# Patient Record
Sex: MALE | Race: BLACK OR AFRICAN AMERICAN | NOT HISPANIC OR LATINO | Employment: OTHER | URBAN - METROPOLITAN AREA
[De-identification: names, ages, dates, MRNs, and addresses within clinical notes are randomized per-mention and may not be internally consistent; named-entity substitution may affect disease eponyms.]

---

## 2017-06-29 ENCOUNTER — HOSPITAL ENCOUNTER (OUTPATIENT)
Dept: LAB | Facility: MEDICAL CENTER | Age: 67
End: 2017-06-29
Attending: NURSE PRACTITIONER
Payer: MEDICARE

## 2017-06-29 LAB
ALBUMIN SERPL BCP-MCNC: 3.1 G/DL (ref 3.2–4.9)
ALBUMIN/GLOB SERPL: 1.2 G/DL
ALP SERPL-CCNC: 64 U/L (ref 30–99)
ALT SERPL-CCNC: 8 U/L (ref 2–50)
ANION GAP SERPL CALC-SCNC: 9 MMOL/L (ref 0–11.9)
AST SERPL-CCNC: 11 U/L (ref 12–45)
BASOPHILS # BLD AUTO: 0.8 % (ref 0–1.8)
BASOPHILS # BLD: 0.04 K/UL (ref 0–0.12)
BILIRUB SERPL-MCNC: 0.3 MG/DL (ref 0.1–1.5)
BUN SERPL-MCNC: 40 MG/DL (ref 8–22)
CALCIUM SERPL-MCNC: 8.7 MG/DL (ref 8.5–10.5)
CHLORIDE SERPL-SCNC: 111 MMOL/L (ref 96–112)
CHOLEST SERPL-MCNC: 118 MG/DL (ref 100–199)
CO2 SERPL-SCNC: 14 MMOL/L (ref 20–33)
CREAT SERPL-MCNC: 1.93 MG/DL (ref 0.5–1.4)
EOSINOPHIL # BLD AUTO: 0.09 K/UL (ref 0–0.51)
EOSINOPHIL NFR BLD: 1.7 % (ref 0–6.9)
ERYTHROCYTE [DISTWIDTH] IN BLOOD BY AUTOMATED COUNT: 58.8 FL (ref 35.9–50)
EST. AVERAGE GLUCOSE BLD GHB EST-MCNC: 88 MG/DL
GFR SERPL CREATININE-BSD FRML MDRD: 35 ML/MIN/1.73 M 2
GLOBULIN SER CALC-MCNC: 2.6 G/DL (ref 1.9–3.5)
GLUCOSE SERPL-MCNC: 107 MG/DL (ref 65–99)
HBA1C MFR BLD: 4.7 % (ref 0–5.6)
HCT VFR BLD AUTO: 31 % (ref 42–52)
HCV AB SER QL: NEGATIVE
HDLC SERPL-MCNC: 67 MG/DL
HGB BLD-MCNC: 10.4 G/DL (ref 14–18)
IMM GRANULOCYTES # BLD AUTO: 0.01 K/UL (ref 0–0.11)
IMM GRANULOCYTES NFR BLD AUTO: 0.2 % (ref 0–0.9)
LDLC SERPL CALC-MCNC: 36 MG/DL
LYMPHOCYTES # BLD AUTO: 0.86 K/UL (ref 1–4.8)
LYMPHOCYTES NFR BLD: 16.5 % (ref 22–41)
MCH RBC QN AUTO: 34 PG (ref 27–33)
MCHC RBC AUTO-ENTMCNC: 33.5 G/DL (ref 33.7–35.3)
MCV RBC AUTO: 101.3 FL (ref 81.4–97.8)
MONOCYTES # BLD AUTO: 0.53 K/UL (ref 0–0.85)
MONOCYTES NFR BLD AUTO: 10.2 % (ref 0–13.4)
NEUTROPHILS # BLD AUTO: 3.69 K/UL (ref 1.82–7.42)
NEUTROPHILS NFR BLD: 70.6 % (ref 44–72)
NRBC # BLD AUTO: 0 K/UL
NRBC BLD AUTO-RTO: 0 /100 WBC
PLATELET # BLD AUTO: 156 K/UL (ref 164–446)
PMV BLD AUTO: 12.3 FL (ref 9–12.9)
POTASSIUM SERPL-SCNC: 6 MMOL/L (ref 3.6–5.5)
PROT SERPL-MCNC: 5.7 G/DL (ref 6–8.2)
PSA SERPL-MCNC: 0.5 NG/ML (ref 0–4)
RBC # BLD AUTO: 3.06 M/UL (ref 4.7–6.1)
SODIUM SERPL-SCNC: 134 MMOL/L (ref 135–145)
T4 FREE SERPL-MCNC: 0.62 NG/DL (ref 0.53–1.43)
TRIGL SERPL-MCNC: 76 MG/DL (ref 0–149)
TSH SERPL DL<=0.005 MIU/L-ACNC: 2.27 UIU/ML (ref 0.3–3.7)
URATE SERPL-MCNC: 2 MG/DL (ref 2.5–8.3)
WBC # BLD AUTO: 5.2 K/UL (ref 4.8–10.8)

## 2017-06-29 PROCEDURE — 85025 COMPLETE CBC W/AUTO DIFF WBC: CPT

## 2017-06-29 PROCEDURE — 36415 COLL VENOUS BLD VENIPUNCTURE: CPT

## 2017-06-29 PROCEDURE — 84550 ASSAY OF BLOOD/URIC ACID: CPT

## 2017-06-29 PROCEDURE — 80053 COMPREHEN METABOLIC PANEL: CPT

## 2017-06-29 PROCEDURE — 80061 LIPID PANEL: CPT

## 2017-06-29 PROCEDURE — 84153 ASSAY OF PSA TOTAL: CPT

## 2017-06-29 PROCEDURE — 84439 ASSAY OF FREE THYROXINE: CPT

## 2017-06-29 PROCEDURE — 84443 ASSAY THYROID STIM HORMONE: CPT

## 2017-06-29 PROCEDURE — 86803 HEPATITIS C AB TEST: CPT

## 2017-06-29 PROCEDURE — 83036 HEMOGLOBIN GLYCOSYLATED A1C: CPT

## 2017-08-16 ENCOUNTER — HOSPITAL ENCOUNTER (OUTPATIENT)
Dept: LAB | Facility: MEDICAL CENTER | Age: 67
End: 2017-08-16
Attending: NURSE PRACTITIONER
Payer: MEDICARE

## 2017-08-16 LAB
ALBUMIN SERPL BCP-MCNC: 3.2 G/DL (ref 3.2–4.9)
ALBUMIN/GLOB SERPL: 1.2 G/DL
ALP SERPL-CCNC: 77 U/L (ref 30–99)
ALT SERPL-CCNC: 9 U/L (ref 2–50)
ANION GAP SERPL CALC-SCNC: 12 MMOL/L (ref 0–11.9)
AST SERPL-CCNC: 16 U/L (ref 12–45)
BASOPHILS # BLD AUTO: 0.7 % (ref 0–1.8)
BASOPHILS # BLD: 0.04 K/UL (ref 0–0.12)
BILIRUB SERPL-MCNC: 0.4 MG/DL (ref 0.1–1.5)
BUN SERPL-MCNC: 10 MG/DL (ref 8–22)
CALCIUM SERPL-MCNC: 8.6 MG/DL (ref 8.5–10.5)
CHLORIDE SERPL-SCNC: 107 MMOL/L (ref 96–112)
CO2 SERPL-SCNC: 21 MMOL/L (ref 20–33)
CREAT SERPL-MCNC: 1.01 MG/DL (ref 0.5–1.4)
EOSINOPHIL # BLD AUTO: 0.19 K/UL (ref 0–0.51)
EOSINOPHIL NFR BLD: 3.1 % (ref 0–6.9)
ERYTHROCYTE [DISTWIDTH] IN BLOOD BY AUTOMATED COUNT: 58.3 FL (ref 35.9–50)
FERRITIN SERPL-MCNC: 141.7 NG/ML (ref 22–322)
GFR SERPL CREATININE-BSD FRML MDRD: >60 ML/MIN/1.73 M 2
GLOBULIN SER CALC-MCNC: 2.7 G/DL (ref 1.9–3.5)
GLUCOSE SERPL-MCNC: 91 MG/DL (ref 65–99)
HCT VFR BLD AUTO: 33.1 % (ref 42–52)
HGB BLD-MCNC: 11.4 G/DL (ref 14–18)
IMM GRANULOCYTES # BLD AUTO: 0.02 K/UL (ref 0–0.11)
IMM GRANULOCYTES NFR BLD AUTO: 0.3 % (ref 0–0.9)
IRON SATN MFR SERPL: 47 % (ref 15–55)
IRON SERPL-MCNC: 113 UG/DL (ref 50–180)
LYMPHOCYTES # BLD AUTO: 1 K/UL (ref 1–4.8)
LYMPHOCYTES NFR BLD: 16.3 % (ref 22–41)
MCH RBC QN AUTO: 34.5 PG (ref 27–33)
MCHC RBC AUTO-ENTMCNC: 34.4 G/DL (ref 33.7–35.3)
MCV RBC AUTO: 100.3 FL (ref 81.4–97.8)
MONOCYTES # BLD AUTO: 0.65 K/UL (ref 0–0.85)
MONOCYTES NFR BLD AUTO: 10.6 % (ref 0–13.4)
NEUTROPHILS # BLD AUTO: 4.25 K/UL (ref 1.82–7.42)
NEUTROPHILS NFR BLD: 69 % (ref 44–72)
NRBC # BLD AUTO: 0 K/UL
NRBC BLD AUTO-RTO: 0 /100 WBC
PLATELET # BLD AUTO: 195 K/UL (ref 164–446)
PMV BLD AUTO: 11.9 FL (ref 9–12.9)
POTASSIUM SERPL-SCNC: 4.4 MMOL/L (ref 3.6–5.5)
PROT SERPL-MCNC: 5.9 G/DL (ref 6–8.2)
RBC # BLD AUTO: 3.3 M/UL (ref 4.7–6.1)
SODIUM SERPL-SCNC: 140 MMOL/L (ref 135–145)
TIBC SERPL-MCNC: 238 UG/DL (ref 250–450)
WBC # BLD AUTO: 6.2 K/UL (ref 4.8–10.8)

## 2017-08-16 PROCEDURE — 80053 COMPREHEN METABOLIC PANEL: CPT

## 2017-08-16 PROCEDURE — 85025 COMPLETE CBC W/AUTO DIFF WBC: CPT

## 2017-08-16 PROCEDURE — 82728 ASSAY OF FERRITIN: CPT

## 2017-08-16 PROCEDURE — 83550 IRON BINDING TEST: CPT

## 2017-08-16 PROCEDURE — 83540 ASSAY OF IRON: CPT

## 2017-08-16 PROCEDURE — 36415 COLL VENOUS BLD VENIPUNCTURE: CPT

## 2017-09-01 ENCOUNTER — HOSPITAL ENCOUNTER (OUTPATIENT)
Dept: LAB | Facility: MEDICAL CENTER | Age: 67
End: 2017-09-01
Attending: INTERNAL MEDICINE
Payer: MEDICARE

## 2017-09-01 LAB
FERRITIN SERPL-MCNC: 153 NG/ML (ref 22–322)
FOLATE SERPL-MCNC: >24 NG/ML
IRON SATN MFR SERPL: 58 % (ref 15–55)
IRON SERPL-MCNC: 150 UG/DL (ref 50–180)
TIBC SERPL-MCNC: 258 UG/DL (ref 250–450)
VIT B12 SERPL-MCNC: 1128 PG/ML (ref 211–911)

## 2017-09-01 PROCEDURE — 82607 VITAMIN B-12: CPT

## 2017-09-01 PROCEDURE — 83550 IRON BINDING TEST: CPT

## 2017-09-01 PROCEDURE — 36415 COLL VENOUS BLD VENIPUNCTURE: CPT

## 2017-09-01 PROCEDURE — 83540 ASSAY OF IRON: CPT

## 2017-09-01 PROCEDURE — 82728 ASSAY OF FERRITIN: CPT

## 2017-09-01 PROCEDURE — 82746 ASSAY OF FOLIC ACID SERUM: CPT

## 2018-06-15 ENCOUNTER — HOSPITAL ENCOUNTER (OUTPATIENT)
Dept: LAB | Facility: MEDICAL CENTER | Age: 68
End: 2018-06-15
Attending: FAMILY MEDICINE
Payer: MEDICARE

## 2018-06-15 LAB
ALBUMIN SERPL BCP-MCNC: 3.6 G/DL (ref 3.2–4.9)
ALBUMIN/GLOB SERPL: 1.1 G/DL
ALP SERPL-CCNC: 84 U/L (ref 30–99)
ALT SERPL-CCNC: 6 U/L (ref 2–50)
AMYLASE SERPL-CCNC: 80 U/L (ref 20–103)
ANION GAP SERPL CALC-SCNC: 13 MMOL/L (ref 0–11.9)
AST SERPL-CCNC: 17 U/L (ref 12–45)
BASOPHILS # BLD AUTO: 0.6 % (ref 0–1.8)
BASOPHILS # BLD: 0.03 K/UL (ref 0–0.12)
BILIRUB SERPL-MCNC: 0.4 MG/DL (ref 0.1–1.5)
BUN SERPL-MCNC: 13 MG/DL (ref 8–22)
CALCIUM SERPL-MCNC: 8.8 MG/DL (ref 8.5–10.5)
CHLORIDE SERPL-SCNC: 103 MMOL/L (ref 96–112)
CHOLEST SERPL-MCNC: 195 MG/DL (ref 100–199)
CO2 SERPL-SCNC: 23 MMOL/L (ref 20–33)
CREAT SERPL-MCNC: 1.07 MG/DL (ref 0.5–1.4)
EOSINOPHIL # BLD AUTO: 0.16 K/UL (ref 0–0.51)
EOSINOPHIL NFR BLD: 3.2 % (ref 0–6.9)
ERYTHROCYTE [DISTWIDTH] IN BLOOD BY AUTOMATED COUNT: 53.2 FL (ref 35.9–50)
GLOBULIN SER CALC-MCNC: 3.2 G/DL (ref 1.9–3.5)
GLUCOSE SERPL-MCNC: 82 MG/DL (ref 65–99)
HCT VFR BLD AUTO: 36.8 % (ref 42–52)
HDLC SERPL-MCNC: 127 MG/DL
HGB BLD-MCNC: 13 G/DL (ref 14–18)
IMM GRANULOCYTES # BLD AUTO: 0.01 K/UL (ref 0–0.11)
IMM GRANULOCYTES NFR BLD AUTO: 0.2 % (ref 0–0.9)
LDLC SERPL CALC-MCNC: 48 MG/DL
LIPASE SERPL-CCNC: 52 U/L (ref 11–82)
LYMPHOCYTES # BLD AUTO: 0.86 K/UL (ref 1–4.8)
LYMPHOCYTES NFR BLD: 17.1 % (ref 22–41)
MCH RBC QN AUTO: 36.1 PG (ref 27–33)
MCHC RBC AUTO-ENTMCNC: 35.3 G/DL (ref 33.7–35.3)
MCV RBC AUTO: 102.2 FL (ref 81.4–97.8)
MONOCYTES # BLD AUTO: 0.49 K/UL (ref 0–0.85)
MONOCYTES NFR BLD AUTO: 9.7 % (ref 0–13.4)
NEUTROPHILS # BLD AUTO: 3.49 K/UL (ref 1.82–7.42)
NEUTROPHILS NFR BLD: 69.2 % (ref 44–72)
NRBC # BLD AUTO: 0 K/UL
NRBC BLD-RTO: 0 /100 WBC
PLATELET # BLD AUTO: 182 K/UL (ref 164–446)
PMV BLD AUTO: 11.9 FL (ref 9–12.9)
POTASSIUM SERPL-SCNC: 4.4 MMOL/L (ref 3.6–5.5)
PROT SERPL-MCNC: 6.8 G/DL (ref 6–8.2)
PSA SERPL-MCNC: 0.69 NG/ML (ref 0–4)
RBC # BLD AUTO: 3.6 M/UL (ref 4.7–6.1)
SODIUM SERPL-SCNC: 139 MMOL/L (ref 135–145)
TRIGL SERPL-MCNC: 102 MG/DL (ref 0–149)
TSH SERPL DL<=0.005 MIU/L-ACNC: 1.57 UIU/ML (ref 0.38–5.33)
URATE SERPL-MCNC: 1.5 MG/DL (ref 2.5–8.3)
WBC # BLD AUTO: 5 K/UL (ref 4.8–10.8)

## 2018-06-15 PROCEDURE — 83036 HEMOGLOBIN GLYCOSYLATED A1C: CPT

## 2018-06-15 PROCEDURE — 84153 ASSAY OF PSA TOTAL: CPT

## 2018-06-15 PROCEDURE — 83690 ASSAY OF LIPASE: CPT

## 2018-06-15 PROCEDURE — 80053 COMPREHEN METABOLIC PANEL: CPT

## 2018-06-15 PROCEDURE — 82150 ASSAY OF AMYLASE: CPT

## 2018-06-15 PROCEDURE — 84550 ASSAY OF BLOOD/URIC ACID: CPT

## 2018-06-15 PROCEDURE — 36415 COLL VENOUS BLD VENIPUNCTURE: CPT

## 2018-06-15 PROCEDURE — 80061 LIPID PANEL: CPT

## 2018-06-15 PROCEDURE — 85025 COMPLETE CBC W/AUTO DIFF WBC: CPT

## 2018-06-15 PROCEDURE — 84443 ASSAY THYROID STIM HORMONE: CPT

## 2018-06-16 ENCOUNTER — HOSPITAL ENCOUNTER (OUTPATIENT)
Facility: MEDICAL CENTER | Age: 68
End: 2018-06-16
Attending: FAMILY MEDICINE
Payer: MEDICARE

## 2018-06-16 LAB
APPEARANCE UR: CLEAR
BACTERIA #/AREA URNS HPF: NEGATIVE /HPF
BILIRUB UR QL STRIP.AUTO: NEGATIVE
COLOR UR: YELLOW
EPI CELLS #/AREA URNS HPF: NEGATIVE /HPF
EST. AVERAGE GLUCOSE BLD GHB EST-MCNC: 91 MG/DL
GLUCOSE UR STRIP.AUTO-MCNC: NEGATIVE MG/DL
HBA1C MFR BLD: 4.8 % (ref 0–5.6)
HYALINE CASTS #/AREA URNS LPF: ABNORMAL /LPF
KETONES UR STRIP.AUTO-MCNC: NEGATIVE MG/DL
LEUKOCYTE ESTERASE UR QL STRIP.AUTO: ABNORMAL
MICRO URNS: ABNORMAL
NITRITE UR QL STRIP.AUTO: NEGATIVE
PH UR STRIP.AUTO: 6 [PH]
PROT UR QL STRIP: NEGATIVE MG/DL
RBC # URNS HPF: ABNORMAL /HPF
RBC UR QL AUTO: NEGATIVE
SP GR UR STRIP.AUTO: 1.01
UROBILINOGEN UR STRIP.AUTO-MCNC: 0.2 MG/DL
WBC #/AREA URNS HPF: ABNORMAL /HPF

## 2018-06-16 PROCEDURE — 81001 URINALYSIS AUTO W/SCOPE: CPT

## 2019-06-28 ENCOUNTER — HOSPITAL ENCOUNTER (OUTPATIENT)
Dept: RADIOLOGY | Facility: MEDICAL CENTER | Age: 69
End: 2019-06-28
Attending: PHYSICAL MEDICINE & REHABILITATION
Payer: MEDICARE

## 2019-06-28 DIAGNOSIS — M54.16 LUMBAR RADICULOPATHY: ICD-10-CM

## 2019-06-28 PROCEDURE — 72148 MRI LUMBAR SPINE W/O DYE: CPT

## 2019-09-11 ENCOUNTER — APPOINTMENT (OUTPATIENT)
Dept: RADIOLOGY | Facility: MEDICAL CENTER | Age: 69
DRG: 481 | End: 2019-09-11
Attending: EMERGENCY MEDICINE
Payer: MEDICARE

## 2019-09-11 ENCOUNTER — APPOINTMENT (OUTPATIENT)
Dept: RADIOLOGY | Facility: MEDICAL CENTER | Age: 69
DRG: 481 | End: 2019-09-11
Attending: INTERNAL MEDICINE
Payer: MEDICARE

## 2019-09-11 ENCOUNTER — HOSPITAL ENCOUNTER (INPATIENT)
Facility: MEDICAL CENTER | Age: 69
LOS: 9 days | DRG: 481 | End: 2019-09-20
Attending: EMERGENCY MEDICINE | Admitting: INTERNAL MEDICINE
Payer: MEDICARE

## 2019-09-11 DIAGNOSIS — S72.001A CLOSED FRACTURE OF RIGHT HIP, INITIAL ENCOUNTER (HCC): ICD-10-CM

## 2019-09-11 DIAGNOSIS — S72.001A CLOSED FRACTURE OF NECK OF RIGHT FEMUR, INITIAL ENCOUNTER (HCC): ICD-10-CM
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
## 2019-09-11 LAB
ALBUMIN SERPL BCP-MCNC: 3.2 G/DL (ref 3.2–4.9)
ALBUMIN/GLOB SERPL: 1.1 G/DL
ALP SERPL-CCNC: 105 U/L (ref 30–99)
ALT SERPL-CCNC: 10 U/L (ref 2–50)
ANION GAP SERPL CALC-SCNC: 15 MMOL/L (ref 0–11.9)
APTT PPP: 28.7 SEC (ref 24.7–36)
AST SERPL-CCNC: 15 U/L (ref 12–45)
B-OH-BUTYR SERPL-MCNC: 1.54 MMOL/L (ref 0.02–0.27)
BASOPHILS # BLD AUTO: 0.5 % (ref 0–1.8)
BASOPHILS # BLD: 0.04 K/UL (ref 0–0.12)
BILIRUB SERPL-MCNC: 0.5 MG/DL (ref 0.1–1.5)
BUN SERPL-MCNC: 17 MG/DL (ref 8–22)
CALCIUM SERPL-MCNC: 8.5 MG/DL (ref 8.5–10.5)
CHLORIDE SERPL-SCNC: 109 MMOL/L (ref 96–112)
CO2 SERPL-SCNC: 16 MMOL/L (ref 20–33)
CREAT SERPL-MCNC: 1.04 MG/DL (ref 0.5–1.4)
EOSINOPHIL # BLD AUTO: 0.14 K/UL (ref 0–0.51)
EOSINOPHIL NFR BLD: 1.6 % (ref 0–6.9)
ERYTHROCYTE [DISTWIDTH] IN BLOOD BY AUTOMATED COUNT: 61.1 FL (ref 35.9–50)
GLOBULIN SER CALC-MCNC: 2.8 G/DL (ref 1.9–3.5)
GLUCOSE SERPL-MCNC: 84 MG/DL (ref 65–99)
HCT VFR BLD AUTO: 36.7 % (ref 42–52)
HGB BLD-MCNC: 12.2 G/DL (ref 14–18)
IMM GRANULOCYTES # BLD AUTO: 0.02 K/UL (ref 0–0.11)
IMM GRANULOCYTES NFR BLD AUTO: 0.2 % (ref 0–0.9)
INR PPP: 1.08 (ref 0.87–1.13)
LACTATE BLD-SCNC: 3.1 MMOL/L (ref 0.5–2)
LYMPHOCYTES # BLD AUTO: 0.76 K/UL (ref 1–4.8)
LYMPHOCYTES NFR BLD: 8.7 % (ref 22–41)
MAGNESIUM SERPL-MCNC: 1.1 MG/DL (ref 1.5–2.5)
MCH RBC QN AUTO: 35.6 PG (ref 27–33)
MCHC RBC AUTO-ENTMCNC: 33.2 G/DL (ref 33.7–35.3)
MCV RBC AUTO: 107 FL (ref 81.4–97.8)
MONOCYTES # BLD AUTO: 0.59 K/UL (ref 0–0.85)
MONOCYTES NFR BLD AUTO: 6.8 % (ref 0–13.4)
NEUTROPHILS # BLD AUTO: 7.19 K/UL (ref 1.82–7.42)
NEUTROPHILS NFR BLD: 82.2 % (ref 44–72)
NRBC # BLD AUTO: 0 K/UL
NRBC BLD-RTO: 0 /100 WBC
PHOSPHATE SERPL-MCNC: 3.2 MG/DL (ref 2.5–4.5)
PLATELET # BLD AUTO: 103 K/UL (ref 164–446)
PMV BLD AUTO: 12.2 FL (ref 9–12.9)
POTASSIUM SERPL-SCNC: 3.8 MMOL/L (ref 3.6–5.5)
PROT SERPL-MCNC: 6 G/DL (ref 6–8.2)
PROTHROMBIN TIME: 14.2 SEC (ref 12–14.6)
RBC # BLD AUTO: 3.43 M/UL (ref 4.7–6.1)
SODIUM SERPL-SCNC: 140 MMOL/L (ref 135–145)
WBC # BLD AUTO: 8.7 K/UL (ref 4.8–10.8)

## 2019-09-11 PROCEDURE — 71045 X-RAY EXAM CHEST 1 VIEW: CPT

## 2019-09-11 PROCEDURE — 770006 HCHG ROOM/CARE - MED/SURG/GYN SEMI*

## 2019-09-11 PROCEDURE — 73502 X-RAY EXAM HIP UNI 2-3 VIEWS: CPT | Mod: RT

## 2019-09-11 PROCEDURE — 700102 HCHG RX REV CODE 250 W/ 637 OVERRIDE(OP): Performed by: INTERNAL MEDICINE

## 2019-09-11 PROCEDURE — 73080 X-RAY EXAM OF ELBOW: CPT | Mod: RT

## 2019-09-11 PROCEDURE — 81003 URINALYSIS AUTO W/O SCOPE: CPT

## 2019-09-11 PROCEDURE — 84100 ASSAY OF PHOSPHORUS: CPT

## 2019-09-11 PROCEDURE — 85025 COMPLETE CBC W/AUTO DIFF WBC: CPT

## 2019-09-11 PROCEDURE — 99223 1ST HOSP IP/OBS HIGH 75: CPT | Performed by: INTERNAL MEDICINE

## 2019-09-11 PROCEDURE — 700111 HCHG RX REV CODE 636 W/ 250 OVERRIDE (IP)

## 2019-09-11 PROCEDURE — 96374 THER/PROPH/DIAG INJ IV PUSH: CPT

## 2019-09-11 PROCEDURE — 700105 HCHG RX REV CODE 258: Performed by: INTERNAL MEDICINE

## 2019-09-11 PROCEDURE — 70450 CT HEAD/BRAIN W/O DYE: CPT

## 2019-09-11 PROCEDURE — 83735 ASSAY OF MAGNESIUM: CPT

## 2019-09-11 PROCEDURE — 36415 COLL VENOUS BLD VENIPUNCTURE: CPT

## 2019-09-11 PROCEDURE — 83605 ASSAY OF LACTIC ACID: CPT

## 2019-09-11 PROCEDURE — 82010 KETONE BODYS QUAN: CPT

## 2019-09-11 PROCEDURE — 85610 PROTHROMBIN TIME: CPT

## 2019-09-11 PROCEDURE — 99285 EMERGENCY DEPT VISIT HI MDM: CPT

## 2019-09-11 PROCEDURE — 85730 THROMBOPLASTIN TIME PARTIAL: CPT

## 2019-09-11 PROCEDURE — 80053 COMPREHEN METABOLIC PANEL: CPT

## 2019-09-11 PROCEDURE — A9270 NON-COVERED ITEM OR SERVICE: HCPCS | Performed by: INTERNAL MEDICINE

## 2019-09-11 PROCEDURE — 700111 HCHG RX REV CODE 636 W/ 250 OVERRIDE (IP): Performed by: INTERNAL MEDICINE

## 2019-09-11 PROCEDURE — 94760 N-INVAS EAR/PLS OXIMETRY 1: CPT

## 2019-09-11 RX ORDER — BISACODYL 10 MG
10 SUPPOSITORY, RECTAL RECTAL
Status: DISCONTINUED | OUTPATIENT
Start: 2019-09-11 | End: 2019-09-20 | Stop reason: HOSPADM

## 2019-09-11 RX ORDER — SODIUM CHLORIDE 9 MG/ML
500 INJECTION, SOLUTION INTRAVENOUS ONCE
Status: COMPLETED | OUTPATIENT
Start: 2019-09-11 | End: 2019-09-11

## 2019-09-11 RX ORDER — ENALAPRILAT 1.25 MG/ML
1.25 INJECTION INTRAVENOUS EVERY 6 HOURS PRN
Status: DISCONTINUED | OUTPATIENT
Start: 2019-09-11 | End: 2019-09-20 | Stop reason: HOSPADM

## 2019-09-11 RX ORDER — HEPARIN SODIUM 5000 [USP'U]/ML
5000 INJECTION, SOLUTION INTRAVENOUS; SUBCUTANEOUS EVERY 8 HOURS
Status: DISCONTINUED | OUTPATIENT
Start: 2019-09-11 | End: 2019-09-12

## 2019-09-11 RX ORDER — MORPHINE SULFATE 4 MG/ML
INJECTION, SOLUTION INTRAMUSCULAR; INTRAVENOUS
Status: DISPENSED
Start: 2019-09-11 | End: 2019-09-12

## 2019-09-11 RX ORDER — M-VIT,TX,IRON,MINS/CALC/FOLIC 27MG-0.4MG
1 TABLET ORAL DAILY
COMMUNITY

## 2019-09-11 RX ORDER — HYDROMORPHONE HYDROCHLORIDE 1 MG/ML
0.5 INJECTION, SOLUTION INTRAMUSCULAR; INTRAVENOUS; SUBCUTANEOUS
Status: DISCONTINUED | OUTPATIENT
Start: 2019-09-11 | End: 2019-09-20 | Stop reason: HOSPADM

## 2019-09-11 RX ORDER — OMEPRAZOLE 20 MG/1
40 CAPSULE, DELAYED RELEASE ORAL 2 TIMES DAILY
Status: DISCONTINUED | OUTPATIENT
Start: 2019-09-11 | End: 2019-09-20 | Stop reason: HOSPADM

## 2019-09-11 RX ORDER — CLONIDINE 0.3 MG/24H
1 PATCH, EXTENDED RELEASE TRANSDERMAL
Status: DISCONTINUED | OUTPATIENT
Start: 2019-09-13 | End: 2019-09-20 | Stop reason: HOSPADM

## 2019-09-11 RX ORDER — POLYETHYLENE GLYCOL 3350 17 G/17G
1 POWDER, FOR SOLUTION ORAL
Status: DISCONTINUED | OUTPATIENT
Start: 2019-09-11 | End: 2019-09-20 | Stop reason: HOSPADM

## 2019-09-11 RX ORDER — GABAPENTIN 100 MG/1
200 CAPSULE ORAL 3 TIMES DAILY
Status: DISCONTINUED | OUTPATIENT
Start: 2019-09-11 | End: 2019-09-20 | Stop reason: HOSPADM

## 2019-09-11 RX ORDER — CARVEDILOL 6.25 MG/1
25 TABLET ORAL 2 TIMES DAILY
Status: DISCONTINUED | OUTPATIENT
Start: 2019-09-11 | End: 2019-09-20 | Stop reason: HOSPADM

## 2019-09-11 RX ORDER — ONDANSETRON 4 MG/1
4 TABLET, ORALLY DISINTEGRATING ORAL EVERY 4 HOURS PRN
Status: DISCONTINUED | OUTPATIENT
Start: 2019-09-11 | End: 2019-09-20 | Stop reason: HOSPADM

## 2019-09-11 RX ORDER — AMOXICILLIN 250 MG
2 CAPSULE ORAL 2 TIMES DAILY
Status: DISCONTINUED | OUTPATIENT
Start: 2019-09-11 | End: 2019-09-20 | Stop reason: HOSPADM

## 2019-09-11 RX ORDER — MAGNESIUM SULFATE HEPTAHYDRATE 40 MG/ML
4 INJECTION, SOLUTION INTRAVENOUS ONCE
Status: COMPLETED | OUTPATIENT
Start: 2019-09-11 | End: 2019-09-12

## 2019-09-11 RX ORDER — CLONIDINE 0.3 MG/24H
1 PATCH, EXTENDED RELEASE TRANSDERMAL
COMMUNITY

## 2019-09-11 RX ORDER — MORPHINE SULFATE 4 MG/ML
4 INJECTION, SOLUTION INTRAMUSCULAR; INTRAVENOUS ONCE
Status: ACTIVE | OUTPATIENT
Start: 2019-09-11 | End: 2019-09-12

## 2019-09-11 RX ORDER — OXYCODONE HYDROCHLORIDE 10 MG/1
10 TABLET ORAL
Status: DISCONTINUED | OUTPATIENT
Start: 2019-09-11 | End: 2019-09-20 | Stop reason: HOSPADM

## 2019-09-11 RX ORDER — THIAMINE MONONITRATE (VIT B1) 100 MG
100 TABLET ORAL DAILY
Status: DISCONTINUED | OUTPATIENT
Start: 2019-09-12 | End: 2019-09-20 | Stop reason: HOSPADM

## 2019-09-11 RX ORDER — ONDANSETRON 2 MG/ML
INJECTION INTRAMUSCULAR; INTRAVENOUS
Status: DISPENSED
Start: 2019-09-11 | End: 2019-09-12

## 2019-09-11 RX ORDER — ALLOPURINOL 300 MG/1
300 TABLET ORAL DAILY
Status: DISCONTINUED | OUTPATIENT
Start: 2019-09-11 | End: 2019-09-20 | Stop reason: HOSPADM

## 2019-09-11 RX ORDER — ONDANSETRON 2 MG/ML
4 INJECTION INTRAMUSCULAR; INTRAVENOUS ONCE
Status: ACTIVE | OUTPATIENT
Start: 2019-09-11 | End: 2019-09-12

## 2019-09-11 RX ORDER — OXYCODONE HYDROCHLORIDE 5 MG/1
5 TABLET ORAL
Status: DISCONTINUED | OUTPATIENT
Start: 2019-09-11 | End: 2019-09-20 | Stop reason: HOSPADM

## 2019-09-11 RX ORDER — ACETAMINOPHEN 325 MG/1
650 TABLET ORAL EVERY 6 HOURS PRN
Status: DISCONTINUED | OUTPATIENT
Start: 2019-09-11 | End: 2019-09-20 | Stop reason: HOSPADM

## 2019-09-11 RX ORDER — SODIUM CHLORIDE 9 MG/ML
INJECTION, SOLUTION INTRAVENOUS CONTINUOUS
Status: DISCONTINUED | OUTPATIENT
Start: 2019-09-11 | End: 2019-09-18

## 2019-09-11 RX ADMIN — SODIUM CHLORIDE: 9 INJECTION, SOLUTION INTRAVENOUS at 18:09

## 2019-09-11 RX ADMIN — OXYCODONE HYDROCHLORIDE 10 MG: 10 TABLET ORAL at 21:12

## 2019-09-11 RX ADMIN — CARVEDILOL 25 MG: 25 TABLET, FILM COATED ORAL at 18:09

## 2019-09-11 RX ADMIN — GABAPENTIN 200 MG: 100 CAPSULE ORAL at 18:11

## 2019-09-11 RX ADMIN — MAGNESIUM SULFATE IN WATER 4 G: 40 INJECTION, SOLUTION INTRAVENOUS at 23:12

## 2019-09-11 RX ADMIN — SODIUM CHLORIDE 500 ML: 9 INJECTION, SOLUTION INTRAVENOUS at 21:13

## 2019-09-11 RX ADMIN — SODIUM CHLORIDE: 9 INJECTION, SOLUTION INTRAVENOUS at 23:12

## 2019-09-11 RX ADMIN — FENTANYL CITRATE 50 MCG: 50 INJECTION, SOLUTION INTRAMUSCULAR; INTRAVENOUS at 16:22

## 2019-09-11 RX ADMIN — ALLOPURINOL 300 MG: 300 TABLET ORAL at 18:10

## 2019-09-11 ASSESSMENT — COGNITIVE AND FUNCTIONAL STATUS - GENERAL
HELP NEEDED FOR BATHING: A LOT
CLIMB 3 TO 5 STEPS WITH RAILING: TOTAL
EATING MEALS: A LITTLE
DRESSING REGULAR UPPER BODY CLOTHING: A LOT
MOVING FROM LYING ON BACK TO SITTING ON SIDE OF FLAT BED: UNABLE
MOBILITY SCORE: 7
PERSONAL GROOMING: A LOT
WALKING IN HOSPITAL ROOM: TOTAL
DAILY ACTIVITIY SCORE: 12
SUGGESTED CMS G CODE MODIFIER DAILY ACTIVITY: CL
STANDING UP FROM CHAIR USING ARMS: TOTAL
TOILETING: A LOT
TURNING FROM BACK TO SIDE WHILE IN FLAT BAD: A LOT
SUGGESTED CMS G CODE MODIFIER MOBILITY: CM
DRESSING REGULAR LOWER BODY CLOTHING: TOTAL
MOVING TO AND FROM BED TO CHAIR: UNABLE

## 2019-09-11 ASSESSMENT — LIFESTYLE VARIABLES
SUBSTANCE_ABUSE: 0
EVER_SMOKED: NEVER

## 2019-09-11 ASSESSMENT — ENCOUNTER SYMPTOMS
WEAKNESS: 1
HALLUCINATIONS: 0
BRUISES/BLEEDS EASILY: 0
VOMITING: 0
NERVOUS/ANXIOUS: 0
PHOTOPHOBIA: 0
DOUBLE VISION: 0
PALPITATIONS: 0
WEIGHT LOSS: 0
NECK PAIN: 0
FOCAL WEAKNESS: 0
HEADACHES: 0
SPUTUM PRODUCTION: 0
HEARTBURN: 0
HEMOPTYSIS: 0
FEVER: 0
COUGH: 0
SPEECH CHANGE: 0
BLURRED VISION: 0
BACK PAIN: 0
FLANK PAIN: 0
POLYDIPSIA: 0
SENSORY CHANGE: 1
CHILLS: 0
ORTHOPNEA: 0
NAUSEA: 0
TREMORS: 0

## 2019-09-11 NOTE — ASSESSMENT & PLAN NOTE
This is a recurrent problem did seem to be related to his peripheral neuropathy  Resulted in right hip fracture, status post ORIF HIP on 9/12/2019       CT scan of the head without contrast : No acute fracture or intracranial hemorrhage  SNF pending.

## 2019-09-11 NOTE — ED NOTES
Med rec updated and complete. Allergies reviewed.  Pt confirmed last doses taken. Confirmed with home pharmacy  Medications and strengths. No antibiotics on pts profile in last 14 days.    Home pharmacy [address].

## 2019-09-11 NOTE — ED TRIAGE NOTES
Chief Complaint   Patient presents with   • GLF   • Weakness     chornic R sided weakness   pt BIB [facility] from home reports R leg weakness and pain chronic has had scans via Dr. Elliott, reported increase in weakness and experienced GLF today no LOC pt states pain to RUE post fall.

## 2019-09-11 NOTE — ASSESSMENT & PLAN NOTE
Status post ORIF, HIP on 9/12/2019, orthopedic surgery following, snf pending.   Repeated x ray stable will need f/u as outpatient.

## 2019-09-11 NOTE — ED PROVIDER NOTES
ED Provider Note    Scribed for Kris Cabral M.D. by Susan Davila. 9/11/2019  1:06 PM    Primary care provider: Inocente De Oliveira M.D.  Means of arrival: Ambulance  History obtained from: Patient   History limited by: none    CHIEF COMPLAINT  Chief Complaint   Patient presents with   • GLF   • Weakness     chornic R sided weakness       HPI  Chele Beasley is a 69 y.o. male who presents to the Emergency Department for evaluation after a ground level fall where he hit the floor and landed on carpet on his right side. He fell after his right side gave out on him.  He has associated right arm pain and right hip pain. He has had chronic weakness in his lower extremities for the past two years however his weakness has gotten worse the past year. His weakness presented after begin hospitalized for about a month where he has an operation on his stomach. The severity of his pain is an 8/10 when he tries to stand but he has no pain when sitting. Additionally, he has no pain when moving his arm. He wasn't dizzy and didn't have any loss of consciousness at the time of the fall. Denies headache, nausea or vomiting. He uses a walker. He endorsed being right handed and is not on any blood thinners.      REVIEW OF SYSTEMS  Pertinent positives include: right sided weakness, right arm pain, right hip pain.  Pertinent negatives include: headache, nausea or vomiting.  10+ systems reviewed and negative.      PAST MEDICAL HISTORY  Past Medical History:   Diagnosis Date   • Back pain    • Gout    • Hypertension    • Knee pain        SOCIAL HISTORY  Social History     Tobacco Use   • Smoking status: Never Smoker   Substance Use Topics   • Alcohol use: No   • Drug use: No     Social History     Substance and Sexual Activity   Drug Use No       SURGICAL HISTORY  Past Surgical History:   Procedure Laterality Date   • EXPLORATORY LAPAROTOMY N/A 4/27/2016    Procedure: EXPLORATORY LAPAROTOMY;  Surgeon: Santiago Olmedo M.D.;  Location: SURGERY  [facility] ORS;  Service:        CURRENT MEDICATIONS  Home Medications     Reviewed by Trina Celeste R.N. (Registered Nurse) on 09/11/19 at 1259  Med List Status: Partial   Medication Last Dose Status   acyclovir (ZOVIRAX) 400 MG tablet  Active   allopurinol (ZYLOPRIM) 300 MG Tab  Active   carvedilol (COREG) 25 MG Tab  Active   diclofenac EC (VOLTAREN) 75 MG Tablet Delayed Response  Active   gabapentin (NEURONTIN) 100 MG Cap  Active   hydrocodone/acetaminophen (NORCO)  MG Tab  Active   Multiple Vitamins-Minerals (MULTI-VITAMIN GUMMIES) Chew Tab  Active   mupirocin (BACTROBAN) 2 % Ointment  Active   omeprazole (PRILOSEC) 40 MG delayed-release capsule  Active   probenecid (BENEMID) 500 MG Tab  Active   temazepam (RESTORIL) 30 MG capsule  Active                ALLERGIES  No Known Allergies    PHYSICAL EXAM  VITAL SIGNS: /73   Pulse 71   Temp 36.6 °C (97.9 °F) (Temporal)   Resp 15   Wt 70.3 kg (155 lb)   SpO2 98%   BMI 21.02 kg/m²   Reviewed and high normal blood pressure  Constitutional: Well developed, Well nourished.  Appears fatigued  HENT: Normocephalic, atraumatic, bilateral external ears normal, oropharynx moist, No exudates or erythema. 3 cm pink patch on right side of head.   Eyes: Pupils 2 cm bilaterally, PERRLA, conjunctiva pink, no scleral icterus.   Cardiovascular: Regular rate and rhythm. No murmurs, rubs or gallops.  Dorsalis pedis pulse 2+  Respiratory: Lungs clear to auscultation bilaterally. No wheezes, rales, or rhonchi.   Abdominal:  Abdomen soft, non-tender, non distended. No rebound, or guarding.    Skin: No erythema, no rash.   Genitourinary: No costovertebral angle tenderness.   Musculoskeletal: No edema.  Pelvis stable, spine nontender, hip rotation does not elicit pain, leg length normal, straight leg raise elicits hip pain.  No other bony tenderness or deformity.  Neurologic: Alert & oriented x 3, cranial nerves 2-12 intact by passive exam.  Toe flexion extension and  sensation preserved no focal deficit noted.  Psychiatric: Affect normal, Judgment normal, Mood normal.         DIFFERENTIAL DIAGNOSIS:  Hip fracture, contusion, dislocation.     Results for orders placed or performed during the hospital encounter of 06/16/18   URINALYSIS   Result Value Ref Range    Color Yellow     Character Clear     Specific Gravity 1.015 <1.035    Ph 6.0 5.0 - 8.0    Glucose Negative Negative mg/dL    Ketones Negative Negative mg/dL    Protein Negative Negative mg/dL    Bilirubin Negative Negative    Urobilinogen, Urine 0.2 Negative    Nitrite Negative Negative    Leukocyte Esterase Moderate (A) Negative    Occult Blood Negative Negative    Micro Urine Req Microscopic    URINE MICROSCOPIC (W/UA)   Result Value Ref Range    WBC 5-10 (A) /hpf    RBC 5-10 (A) /hpf    Bacteria Negative None /hpf    Epithelial Cells Negative /hpf    Hyaline Cast 0-2 /lpf     EKG: Pending.    RADIOLOGY/PROCEDURES  DX-HIP-COMPLETE - UNILATERAL 2+ RIGHT    (Results Pending)   DX-CHEST-LIMITED (1 VIEW)    (Results Pending)   Hip films reviewed by me and the patient has a nondisplaced femoral neck fracture.  Radiologist interpretation have been reviewed by me.     INTERVENTIONS:  Patient declined analgesic unless his leg needs to be moved    ED COURSE:  Nursing notes, VS, PMSFHx reviewed in chart.     1:06 PM - Patient seen and examined at bedside. I discussed that I do not think the patient has any seriosus injuries to his arm but I would like to x-ray his hip to evaluate for a fracture. Ordered right hip x-ray to evaluate.     Hospitalist and orthopedics Dr. Barros consulted for admission and open reduction internal fixation.    MEDICAL DECISION MAKING:  This patient presents after mechanical fall with a nondisplaced right femoral neck fracture.  There is no evidence of significant head spine torso or extremity injury although he has a mild abrasion to his forehead.    PLAN:  Admission for open reduction and internal  fixation of right hip fracture    CONDITION: Fair.     FINAL IMPRESSION  1. Closed fracture of right hip, initial encounter ([facility])          Susan CHRIS (Scribe), am scribing for, and in the presence of, Kris Cabral M.D..    Electronically signed by: Susan Davila (Scribe), 9/11/2019    Kris CHRIS M.D. personally performed the services described in this documentation, as scribed by Susan Davila in my presence, and it is both accurate and complete. E    The note accurately reflects work and decisions made by me.  Kris Cabral  9/11/2019  3:25 PM

## 2019-09-11 NOTE — H&P
"Hospital Medicine History & Physical Note    Date of Service  9/11/2019    Primary Care Physician  Inocente De Oliveira M.D.    Consultants  Orthopedic surgery    Code Status  Code    Chief Complaint  Right hip pain    History of Presenting Illness  69 y.o. male with history of peripheral neuropathy, hypertension, GERD, who presented 9/11/2019 with complaints of right hip pain after mechanical ground-level fall at home.  According to him, he has been having bilateral lower extremity numbness, tingling, pain and weakness for several months.  1 months ago he stopped going up stairs due to worsening of numbness and weakness of his legs and had one ground-level fall.  This symptoms has been on and off, without alleviating or aggravating factors.  Today according to him he had second ground-level fall, when his legs \"gave in\"  Patient found to have right hip fracture.  Orthopedics consulted  Currently right hip pain is under control.  Additionally on exam there is a swelling of the right elbow and bruise over the right frontal area.  Imaging ordered  Patient drinks 1 glass of zeeshan a day.  Denies smoking or using street drugs  Review of Systems  Review of Systems   Constitutional: Negative for chills, fever and weight loss.   HENT: Negative for ear pain, hearing loss and tinnitus.    Eyes: Negative for blurred vision, double vision and photophobia.   Respiratory: Negative for cough, hemoptysis and sputum production.    Cardiovascular: Negative for chest pain, palpitations and orthopnea.   Gastrointestinal: Negative for heartburn, nausea and vomiting.   Genitourinary: Negative for dysuria, flank pain, frequency and hematuria.   Musculoskeletal: Positive for joint pain. Negative for back pain and neck pain.   Skin: Negative for itching and rash.   Neurological: Positive for sensory change and weakness. Negative for tremors, speech change, focal weakness and headaches.   Endo/Heme/Allergies: Negative for environmental " allergies and polydipsia. Does not bruise/bleed easily.   Psychiatric/Behavioral: Negative for hallucinations and substance abuse. The patient is not nervous/anxious.        Past Medical History   has a past medical history of Back pain, Gout, Hypertension, and Knee pain.    Surgical History   has a past surgical history that includes exploratory laparotomy (N/A, 4/27/2016).     Family History  Denies history of disease in family    Social History   reports that he has never smoked. He does not have any smokeless tobacco history on file.   He drinks 1 glass of zeeshan at night on a daily basis    Allergies  No Known Allergies    Medications  Prior to Admission Medications   Prescriptions Last Dose Informant Patient Reported? Taking?   allopurinol (ZYLOPRIM) 300 MG Tab 9/10/2019 at 0830 Patient's Home Pharmacy Yes No   Sig: Take 300 mg by mouth every day.   carvedilol (COREG) 25 MG Tab 9/10/2019 at 0830 Patient's Home Pharmacy Yes No   Sig: Take 25 mg by mouth 2 times a day.   cloNIDine (CATAPRES) 0.3 MG/24HR PATCH WEEKLY patch 9/6/2019 at 0830 Patient's Home Pharmacy Yes Yes   Sig: Apply 1 Patch to skin as directed every Friday.   diclofenac EC (VOLTAREN) 75 MG Tablet Delayed Response 9/10/2019 at 1730 Patient's Home Pharmacy Yes No   Sig: Take 75 mg by mouth 2 times a day.   gabapentin (NEURONTIN) 100 MG Cap 9/11/2019 at 1730 Patient's Home Pharmacy Yes No   Sig: Take 200 mg by mouth 3 times a day.   hydrocodone/acetaminophen (NORCO)  MG Tab 9/10/2019 at 2130 Patient's Home Pharmacy Yes No   Sig: Take 1 Tab by mouth 5 Times a Day. Max - 5 per day    omeprazole (PRILOSEC) 40 MG delayed-release capsule 9/10/2019 at 1730 Patient's Home Pharmacy Yes No   Sig: Take 40 mg by mouth 2 times a day.   probenecid (BENEMID) 500 MG Tab 9/10/2019 at 0830 Patient's Home Pharmacy Yes No   Sig: Take 500 mg by mouth every day.   therapeutic multivitamin-minerals (THERAGRAN-M) Tab 9/10/2019 at 0830 Patient's Home Pharmacy Yes  Yes   Sig: Take 1 Tab by mouth every day.      Facility-Administered Medications: None       Physical Exam  Temp:  [36.6 °C (97.9 °F)] 36.6 °C (97.9 °F)  Pulse:  [70-71] 70  Resp:  [15] 15  BP: (117-125)/(63-73) 117/63  SpO2:  [96 %-98 %] 96 %    Physical Exam   Constitutional: He is oriented to person, place, and time. He appears well-developed and well-nourished.   HENT:   Head: Normocephalic.   Bruise over the right frontal area   Eyes: Pupils are equal, round, and reactive to light. EOM are normal.   Neck: Normal range of motion. Neck supple.   Cardiovascular: Normal rate, regular rhythm and normal heart sounds.   Pulmonary/Chest: Effort normal and breath sounds normal.   Abdominal: Soft. Bowel sounds are normal.   Musculoskeletal: Normal range of motion.   Right hip joint: tender to palpation, restricted range of motion  Right elbow: Swelling, tenderness to palpation, restricted range of motion.     Neurological: He is alert and oriented to person, place, and time.   Skin: Skin is warm and dry.   Psychiatric: He has a normal mood and affect.   Nursing note and vitals reviewed.      Laboratory:          No results for input(s): ALTSGPT, ASTSGOT, ALKPHOSPHAT, TBILIRUBIN, DBILIRUBIN, GAMMAGT, AMYLASE, LIPASE, ALB, PREALBUMIN, GLUCOSE in the last 72 hours.      No results for input(s): NTPROBNP in the last 72 hours.      No results for input(s): TROPONINT in the last 72 hours.    Urinalysis:    No results found     Imaging:  DX-HIP-COMPLETE - UNILATERAL 2+ RIGHT    (Results Pending)   DX-CHEST-LIMITED (1 VIEW)    (Results Pending)   CT-HEAD W/O    (Results Pending)   DX-ELBOW-COMPLETE 3+ RIGHT    (Results Pending)         Assessment/Plan:  I anticipate this patient will require at least two midnights for appropriate medical management, necessitating inpatient admission.    Fall from ground level  Assessment & Plan  This is a recurrent problem did seem to be related to his peripheral neuropathy  Resulted in right  hip fracture.  X-ray of right elbow: Possible fracture.  Orthopedics to evaluate for need for surgery   CT scan of the head without contrast : No acute fracture or intracranial hemorrhage  PT OT evaluation after work-up and treatment finished  Check vitamin B12, vitamin D levels, TSH    Hypertension- (present on admission)  Assessment & Plan  Blood pressure is under fair control.  We will continue carvedilol, clonidine patch which are his home medications.        Peripheral neuropathy  Assessment & Plan  Alcohol cessation counseling  Continue gabapentin  Check vitamin B12 level, TSH    Lactic acidosis  Assessment & Plan    Probably secondary to dehydration, querry vitamin B1 deficiency secondary to alcohol abuse  We will give IV fluid and reassess      Closed fracture of neck of right femur (HCC)  Assessment & Plan    - Admit to orthopedic floor  - Currently hemodynamically stable  - Ortho Dr. Barros consulted  - Will keep patient n.p.o. for now pending surgery  - Pain control with oxycodone and IV morphine PRN  - PT/OT afterwards        VTE prophylaxis: Heparin

## 2019-09-12 ENCOUNTER — APPOINTMENT (OUTPATIENT)
Dept: RADIOLOGY | Facility: MEDICAL CENTER | Age: 69
DRG: 481 | End: 2019-09-12
Attending: ORTHOPAEDIC SURGERY
Payer: MEDICARE

## 2019-09-12 ENCOUNTER — ANESTHESIA EVENT (OUTPATIENT)
Dept: SURGERY | Facility: MEDICAL CENTER | Age: 69
DRG: 481 | End: 2019-09-12
Payer: MEDICARE

## 2019-09-12 ENCOUNTER — ANESTHESIA (OUTPATIENT)
Dept: SURGERY | Facility: MEDICAL CENTER | Age: 69
DRG: 481 | End: 2019-09-12
Payer: MEDICARE

## 2019-09-12 PROBLEM — E87.20 LACTIC ACIDOSIS: Status: ACTIVE | Noted: 2019-09-12

## 2019-09-12 PROBLEM — G62.9 PERIPHERAL NEUROPATHY: Status: ACTIVE | Noted: 2019-09-12

## 2019-09-12 LAB
APPEARANCE UR: CLEAR
BASOPHILS # BLD AUTO: 0.4 % (ref 0–1.8)
BASOPHILS # BLD: 0.04 K/UL (ref 0–0.12)
BILIRUB UR QL STRIP.AUTO: NEGATIVE
COLOR UR: ABNORMAL
EKG IMPRESSION: NORMAL
EOSINOPHIL # BLD AUTO: 0.06 K/UL (ref 0–0.51)
EOSINOPHIL NFR BLD: 0.6 % (ref 0–6.9)
ERYTHROCYTE [DISTWIDTH] IN BLOOD BY AUTOMATED COUNT: 61.1 FL (ref 35.9–50)
GLUCOSE UR STRIP.AUTO-MCNC: NEGATIVE MG/DL
HCT VFR BLD AUTO: 37.7 % (ref 42–52)
HGB BLD-MCNC: 12.6 G/DL (ref 14–18)
IMM GRANULOCYTES # BLD AUTO: 0.03 K/UL (ref 0–0.11)
IMM GRANULOCYTES NFR BLD AUTO: 0.3 % (ref 0–0.9)
KETONES UR STRIP.AUTO-MCNC: ABNORMAL MG/DL
LACTATE BLD-SCNC: 1 MMOL/L (ref 0.5–2)
LEUKOCYTE ESTERASE UR QL STRIP.AUTO: NEGATIVE
LYMPHOCYTES # BLD AUTO: 0.54 K/UL (ref 1–4.8)
LYMPHOCYTES NFR BLD: 5.8 % (ref 22–41)
MCH RBC QN AUTO: 35.9 PG (ref 27–33)
MCHC RBC AUTO-ENTMCNC: 33.4 G/DL (ref 33.7–35.3)
MCV RBC AUTO: 107.4 FL (ref 81.4–97.8)
MICRO URNS: ABNORMAL
MONOCYTES # BLD AUTO: 0.89 K/UL (ref 0–0.85)
MONOCYTES NFR BLD AUTO: 9.5 % (ref 0–13.4)
NEUTROPHILS # BLD AUTO: 7.82 K/UL (ref 1.82–7.42)
NEUTROPHILS NFR BLD: 83.4 % (ref 44–72)
NITRITE UR QL STRIP.AUTO: NEGATIVE
NRBC # BLD AUTO: 0 K/UL
NRBC BLD-RTO: 0 /100 WBC
PH UR STRIP.AUTO: 5 [PH] (ref 5–8)
PLATELET # BLD AUTO: 111 K/UL (ref 164–446)
PMV BLD AUTO: 10.7 FL (ref 9–12.9)
PROT UR QL STRIP: NEGATIVE MG/DL
RBC # BLD AUTO: 3.51 M/UL (ref 4.7–6.1)
RBC UR QL AUTO: NEGATIVE
SP GR UR STRIP.AUTO: 1.02
UROBILINOGEN UR STRIP.AUTO-MCNC: 1 MG/DL
WBC # BLD AUTO: 9.4 K/UL (ref 4.8–10.8)

## 2019-09-12 PROCEDURE — 700111 HCHG RX REV CODE 636 W/ 250 OVERRIDE (IP): Performed by: ANESTHESIOLOGY

## 2019-09-12 PROCEDURE — 0QS604Z REPOSITION RIGHT UPPER FEMUR WITH INTERNAL FIXATION DEVICE, OPEN APPROACH: ICD-10-PCS | Performed by: ORTHOPAEDIC SURGERY

## 2019-09-12 PROCEDURE — 73502 X-RAY EXAM HIP UNI 2-3 VIEWS: CPT | Mod: RT

## 2019-09-12 PROCEDURE — 160035 HCHG PACU - 1ST 60 MINS PHASE I: Performed by: ORTHOPAEDIC SURGERY

## 2019-09-12 PROCEDURE — 99233 SBSQ HOSP IP/OBS HIGH 50: CPT | Performed by: INTERNAL MEDICINE

## 2019-09-12 PROCEDURE — 500424 HCHG DRESSING, AIRSTRIP: Performed by: ORTHOPAEDIC SURGERY

## 2019-09-12 PROCEDURE — 700102 HCHG RX REV CODE 250 W/ 637 OVERRIDE(OP): Performed by: INTERNAL MEDICINE

## 2019-09-12 PROCEDURE — 93005 ELECTROCARDIOGRAM TRACING: CPT | Performed by: ORTHOPAEDIC SURGERY

## 2019-09-12 PROCEDURE — A6223 GAUZE >16<=48 NO W/SAL W/O B: HCPCS | Performed by: ORTHOPAEDIC SURGERY

## 2019-09-12 PROCEDURE — 700105 HCHG RX REV CODE 258: Performed by: ANESTHESIOLOGY

## 2019-09-12 PROCEDURE — 700111 HCHG RX REV CODE 636 W/ 250 OVERRIDE (IP): Performed by: ORTHOPAEDIC SURGERY

## 2019-09-12 PROCEDURE — 770006 HCHG ROOM/CARE - MED/SURG/GYN SEMI*

## 2019-09-12 PROCEDURE — 700102 HCHG RX REV CODE 250 W/ 637 OVERRIDE(OP): Performed by: ANESTHESIOLOGY

## 2019-09-12 PROCEDURE — 501838 HCHG SUTURE GENERAL: Performed by: ORTHOPAEDIC SURGERY

## 2019-09-12 PROCEDURE — 93010 ELECTROCARDIOGRAM REPORT: CPT | Performed by: INTERNAL MEDICINE

## 2019-09-12 PROCEDURE — 500423 HCHG DRESSING, ABD COMBINE: Performed by: ORTHOPAEDIC SURGERY

## 2019-09-12 PROCEDURE — 85025 COMPLETE CBC W/AUTO DIFF WBC: CPT

## 2019-09-12 PROCEDURE — 36415 COLL VENOUS BLD VENIPUNCTURE: CPT

## 2019-09-12 PROCEDURE — 500367 HCHG DRAIN KIT, HEMOVAC: Performed by: ORTHOPAEDIC SURGERY

## 2019-09-12 PROCEDURE — 500382 HCHG DRAIN, PENROSE 1X18: Performed by: ORTHOPAEDIC SURGERY

## 2019-09-12 PROCEDURE — 500891 HCHG PACK, ORTHO MAJOR: Performed by: ORTHOPAEDIC SURGERY

## 2019-09-12 PROCEDURE — 700111 HCHG RX REV CODE 636 W/ 250 OVERRIDE (IP): Performed by: INTERNAL MEDICINE

## 2019-09-12 PROCEDURE — 160041 HCHG SURGERY MINUTES - EA ADDL 1 MIN LEVEL 4: Performed by: ORTHOPAEDIC SURGERY

## 2019-09-12 PROCEDURE — 83605 ASSAY OF LACTIC ACID: CPT

## 2019-09-12 PROCEDURE — 160002 HCHG RECOVERY MINUTES (STAT): Performed by: ORTHOPAEDIC SURGERY

## 2019-09-12 PROCEDURE — 160009 HCHG ANES TIME/MIN: Performed by: ORTHOPAEDIC SURGERY

## 2019-09-12 PROCEDURE — 160048 HCHG OR STATISTICAL LEVEL 1-5: Performed by: ORTHOPAEDIC SURGERY

## 2019-09-12 PROCEDURE — A9270 NON-COVERED ITEM OR SERVICE: HCPCS | Performed by: ANESTHESIOLOGY

## 2019-09-12 PROCEDURE — 160029 HCHG SURGERY MINUTES - 1ST 30 MINS LEVEL 4: Performed by: ORTHOPAEDIC SURGERY

## 2019-09-12 PROCEDURE — 502000 HCHG MISC OR IMPLANTS RC 0278: Performed by: ORTHOPAEDIC SURGERY

## 2019-09-12 PROCEDURE — 700105 HCHG RX REV CODE 258: Performed by: INTERNAL MEDICINE

## 2019-09-12 PROCEDURE — 160036 HCHG PACU - EA ADDL 30 MINS PHASE I: Performed by: ORTHOPAEDIC SURGERY

## 2019-09-12 PROCEDURE — 700101 HCHG RX REV CODE 250: Performed by: ANESTHESIOLOGY

## 2019-09-12 PROCEDURE — A9270 NON-COVERED ITEM OR SERVICE: HCPCS | Performed by: INTERNAL MEDICINE

## 2019-09-12 DEVICE — IMPLANTABLE DEVICE: Type: IMPLANTABLE DEVICE | Site: HIP | Status: FUNCTIONAL

## 2019-09-12 RX ORDER — HALOPERIDOL 5 MG/ML
1 INJECTION INTRAMUSCULAR
Status: DISCONTINUED | OUTPATIENT
Start: 2019-09-12 | End: 2019-09-12 | Stop reason: HOSPADM

## 2019-09-12 RX ORDER — OXYCODONE HCL 5 MG/5 ML
5 SOLUTION, ORAL ORAL
Status: COMPLETED | OUTPATIENT
Start: 2019-09-12 | End: 2019-09-12

## 2019-09-12 RX ORDER — CELECOXIB 200 MG/1
200 CAPSULE ORAL ONCE
Status: CANCELLED | OUTPATIENT
Start: 2019-09-12 | End: 2019-09-12

## 2019-09-12 RX ORDER — HEPARIN SODIUM 5000 [USP'U]/ML
5000 INJECTION, SOLUTION INTRAVENOUS; SUBCUTANEOUS EVERY 8 HOURS
Status: DISCONTINUED | OUTPATIENT
Start: 2019-09-13 | End: 2019-09-20 | Stop reason: HOSPADM

## 2019-09-12 RX ORDER — GABAPENTIN 300 MG/1
300 CAPSULE ORAL ONCE
Status: CANCELLED | OUTPATIENT
Start: 2019-09-12 | End: 2019-09-12

## 2019-09-12 RX ORDER — ONDANSETRON 2 MG/ML
INJECTION INTRAMUSCULAR; INTRAVENOUS PRN
Status: DISCONTINUED | OUTPATIENT
Start: 2019-09-12 | End: 2019-09-12 | Stop reason: SURG

## 2019-09-12 RX ORDER — HYDROMORPHONE HYDROCHLORIDE 1 MG/ML
0.1 INJECTION, SOLUTION INTRAMUSCULAR; INTRAVENOUS; SUBCUTANEOUS
Status: DISCONTINUED | OUTPATIENT
Start: 2019-09-12 | End: 2019-09-12 | Stop reason: HOSPADM

## 2019-09-12 RX ORDER — SODIUM CHLORIDE, SODIUM LACTATE, POTASSIUM CHLORIDE, CALCIUM CHLORIDE 600; 310; 30; 20 MG/100ML; MG/100ML; MG/100ML; MG/100ML
INJECTION, SOLUTION INTRAVENOUS
Status: DISCONTINUED | OUTPATIENT
Start: 2019-09-12 | End: 2019-09-12 | Stop reason: SURG

## 2019-09-12 RX ORDER — PHENYLEPHRINE HCL IN 0.9% NACL 0.5 MG/5ML
SYRINGE (ML) INTRAVENOUS PRN
Status: DISCONTINUED | OUTPATIENT
Start: 2019-09-12 | End: 2019-09-12 | Stop reason: SURG

## 2019-09-12 RX ORDER — DEXAMETHASONE SODIUM PHOSPHATE 4 MG/ML
INJECTION, SOLUTION INTRA-ARTICULAR; INTRALESIONAL; INTRAMUSCULAR; INTRAVENOUS; SOFT TISSUE PRN
Status: DISCONTINUED | OUTPATIENT
Start: 2019-09-12 | End: 2019-09-12 | Stop reason: SURG

## 2019-09-12 RX ORDER — OXYCODONE HCL 5 MG/5 ML
10 SOLUTION, ORAL ORAL
Status: COMPLETED | OUTPATIENT
Start: 2019-09-12 | End: 2019-09-12

## 2019-09-12 RX ORDER — LIDOCAINE HYDROCHLORIDE 20 MG/ML
INJECTION, SOLUTION EPIDURAL; INFILTRATION; INTRACAUDAL; PERINEURAL PRN
Status: DISCONTINUED | OUTPATIENT
Start: 2019-09-12 | End: 2019-09-12 | Stop reason: SURG

## 2019-09-12 RX ORDER — CEFAZOLIN SODIUM 1 G/3ML
INJECTION, POWDER, FOR SOLUTION INTRAMUSCULAR; INTRAVENOUS PRN
Status: DISCONTINUED | OUTPATIENT
Start: 2019-09-12 | End: 2019-09-12 | Stop reason: SURG

## 2019-09-12 RX ORDER — HYDROMORPHONE HYDROCHLORIDE 1 MG/ML
0.2 INJECTION, SOLUTION INTRAMUSCULAR; INTRAVENOUS; SUBCUTANEOUS
Status: DISCONTINUED | OUTPATIENT
Start: 2019-09-12 | End: 2019-09-12 | Stop reason: HOSPADM

## 2019-09-12 RX ORDER — HYDROMORPHONE HYDROCHLORIDE 2 MG/ML
INJECTION, SOLUTION INTRAMUSCULAR; INTRAVENOUS; SUBCUTANEOUS PRN
Status: DISCONTINUED | OUTPATIENT
Start: 2019-09-12 | End: 2019-09-12 | Stop reason: SURG

## 2019-09-12 RX ORDER — HYDROMORPHONE HYDROCHLORIDE 1 MG/ML
0.4 INJECTION, SOLUTION INTRAMUSCULAR; INTRAVENOUS; SUBCUTANEOUS
Status: DISCONTINUED | OUTPATIENT
Start: 2019-09-12 | End: 2019-09-12 | Stop reason: HOSPADM

## 2019-09-12 RX ORDER — ACETAMINOPHEN 500 MG
1000 TABLET ORAL ONCE
Status: CANCELLED | OUTPATIENT
Start: 2019-09-12 | End: 2019-09-12

## 2019-09-12 RX ORDER — ONDANSETRON 2 MG/ML
4 INJECTION INTRAMUSCULAR; INTRAVENOUS
Status: DISCONTINUED | OUTPATIENT
Start: 2019-09-12 | End: 2019-09-12 | Stop reason: HOSPADM

## 2019-09-12 RX ORDER — SODIUM CHLORIDE, SODIUM LACTATE, POTASSIUM CHLORIDE, CALCIUM CHLORIDE 600; 310; 30; 20 MG/100ML; MG/100ML; MG/100ML; MG/100ML
INJECTION, SOLUTION INTRAVENOUS CONTINUOUS
Status: DISCONTINUED | OUTPATIENT
Start: 2019-09-12 | End: 2019-09-12 | Stop reason: HOSPADM

## 2019-09-12 RX ORDER — CEFAZOLIN SODIUM 2 G/100ML
2 INJECTION, SOLUTION INTRAVENOUS EVERY 8 HOURS
Status: COMPLETED | OUTPATIENT
Start: 2019-09-12 | End: 2019-09-13

## 2019-09-12 RX ADMIN — HYDROMORPHONE HYDROCHLORIDE 0.5 MG: 1 INJECTION, SOLUTION INTRAMUSCULAR; INTRAVENOUS; SUBCUTANEOUS at 10:52

## 2019-09-12 RX ADMIN — EPHEDRINE SULFATE 20 MG: 50 INJECTION, SOLUTION INTRAVENOUS at 07:50

## 2019-09-12 RX ADMIN — SENNOSIDES, DOCUSATE SODIUM 2 TABLET: 50; 8.6 TABLET, FILM COATED ORAL at 17:54

## 2019-09-12 RX ADMIN — LIDOCAINE HYDROCHLORIDE 60 MG: 20 INJECTION, SOLUTION EPIDURAL; INFILTRATION; INTRACAUDAL at 07:35

## 2019-09-12 RX ADMIN — CARVEDILOL 25 MG: 25 TABLET, FILM COATED ORAL at 04:45

## 2019-09-12 RX ADMIN — FENTANYL CITRATE 50 MCG: 50 INJECTION, SOLUTION INTRAMUSCULAR; INTRAVENOUS at 08:15

## 2019-09-12 RX ADMIN — CEFAZOLIN 2 G: 330 INJECTION, POWDER, FOR SOLUTION INTRAMUSCULAR; INTRAVENOUS at 07:35

## 2019-09-12 RX ADMIN — FENTANYL CITRATE 50 MCG: 50 INJECTION, SOLUTION INTRAMUSCULAR; INTRAVENOUS at 08:43

## 2019-09-12 RX ADMIN — FENTANYL CITRATE 50 MCG: 50 INJECTION, SOLUTION INTRAMUSCULAR; INTRAVENOUS at 07:50

## 2019-09-12 RX ADMIN — CARVEDILOL 25 MG: 25 TABLET, FILM COATED ORAL at 17:54

## 2019-09-12 RX ADMIN — FENTANYL CITRATE 50 MCG: 50 INJECTION, SOLUTION INTRAMUSCULAR; INTRAVENOUS at 07:55

## 2019-09-12 RX ADMIN — GABAPENTIN 200 MG: 100 CAPSULE ORAL at 17:54

## 2019-09-12 RX ADMIN — DEXAMETHASONE SODIUM PHOSPHATE 8 MG: 4 INJECTION, SOLUTION INTRA-ARTICULAR; INTRALESIONAL; INTRAMUSCULAR; INTRAVENOUS; SOFT TISSUE at 07:58

## 2019-09-12 RX ADMIN — CEFAZOLIN 2 G: 10 INJECTION, POWDER, FOR SOLUTION INTRAVENOUS at 15:55

## 2019-09-12 RX ADMIN — ONDANSETRON 4 MG: 2 INJECTION INTRAMUSCULAR; INTRAVENOUS at 08:44

## 2019-09-12 RX ADMIN — EPHEDRINE SULFATE 10 MG: 50 INJECTION, SOLUTION INTRAVENOUS at 08:22

## 2019-09-12 RX ADMIN — Medication 200 MCG: at 07:48

## 2019-09-12 RX ADMIN — HYDROMORPHONE HYDROCHLORIDE 1 MG: 2 INJECTION, SOLUTION INTRAMUSCULAR; INTRAVENOUS; SUBCUTANEOUS at 08:54

## 2019-09-12 RX ADMIN — SODIUM CHLORIDE: 9 INJECTION, SOLUTION INTRAVENOUS at 10:42

## 2019-09-12 RX ADMIN — SODIUM CHLORIDE: 9 INJECTION, SOLUTION INTRAVENOUS at 17:58

## 2019-09-12 RX ADMIN — Medication 200 MCG: at 07:42

## 2019-09-12 RX ADMIN — PROPOFOL 100 MG: 10 INJECTION, EMULSION INTRAVENOUS at 07:55

## 2019-09-12 RX ADMIN — FENTANYL CITRATE 50 MCG: 50 INJECTION, SOLUTION INTRAMUSCULAR; INTRAVENOUS at 07:35

## 2019-09-12 RX ADMIN — GABAPENTIN 200 MG: 100 CAPSULE ORAL at 10:58

## 2019-09-12 RX ADMIN — OXYCODONE HYDROCHLORIDE 10 MG: 5 SOLUTION ORAL at 09:24

## 2019-09-12 RX ADMIN — PROPOFOL 140 MG: 10 INJECTION, EMULSION INTRAVENOUS at 07:35

## 2019-09-12 RX ADMIN — SODIUM CHLORIDE, POTASSIUM CHLORIDE, SODIUM LACTATE AND CALCIUM CHLORIDE: 600; 310; 30; 20 INJECTION, SOLUTION INTRAVENOUS at 07:30

## 2019-09-12 RX ADMIN — EPHEDRINE SULFATE 20 MG: 50 INJECTION, SOLUTION INTRAVENOUS at 07:54

## 2019-09-12 RX ADMIN — OXYCODONE HYDROCHLORIDE 10 MG: 10 TABLET ORAL at 21:50

## 2019-09-12 RX ADMIN — OMEPRAZOLE 40 MG: 20 CAPSULE, DELAYED RELEASE ORAL at 17:54

## 2019-09-12 RX ADMIN — Medication 200 MCG: at 07:45

## 2019-09-12 ASSESSMENT — LIFESTYLE VARIABLES
EVER HAD A DRINK FIRST THING IN THE MORNING TO STEADY YOUR NERVES TO GET RID OF A HANGOVER: NO
AVERAGE NUMBER OF DAYS PER WEEK YOU HAVE A DRINK CONTAINING ALCOHOL: 7
TOTAL SCORE: 0
HAVE PEOPLE ANNOYED YOU BY CRITICIZING YOUR DRINKING: NO
ON A TYPICAL DAY WHEN YOU DRINK ALCOHOL HOW MANY DRINKS DO YOU HAVE: 1
TOTAL SCORE: 0
HAVE YOU EVER FELT YOU SHOULD CUT DOWN ON YOUR DRINKING: NO
ALCOHOL_USE: YES
TOTAL SCORE: 0
HOW MANY TIMES IN THE PAST YEAR HAVE YOU HAD 5 OR MORE DRINKS IN A DAY: 0
DOES PATIENT WANT TO STOP DRINKING: NO
EVER FELT BAD OR GUILTY ABOUT YOUR DRINKING: NO
CONSUMPTION TOTAL: NEGATIVE

## 2019-09-12 ASSESSMENT — PATIENT HEALTH QUESTIONNAIRE - PHQ9
1. LITTLE INTEREST OR PLEASURE IN DOING THINGS: SEVERAL DAYS
SUM OF ALL RESPONSES TO PHQ QUESTIONS 1-9: 4
9. THOUGHTS THAT YOU WOULD BE BETTER OFF DEAD, OR OF HURTING YOURSELF: NOT AT ALL
7. TROUBLE CONCENTRATING ON THINGS, SUCH AS READING THE NEWSPAPER OR WATCHING TELEVISION: NOT AT ALL
2. FEELING DOWN, DEPRESSED, IRRITABLE, OR HOPELESS: NOT AT ALL
4. FEELING TIRED OR HAVING LITTLE ENERGY: SEVERAL DAYS
5. POOR APPETITE OR OVEREATING: SEVERAL DAYS
8. MOVING OR SPEAKING SO SLOWLY THAT OTHER PEOPLE COULD HAVE NOTICED. OR THE OPPOSITE, BEING SO FIGETY OR RESTLESS THAT YOU HAVE BEEN MOVING AROUND A LOT MORE THAN USUAL: NOT AT ALL
3. TROUBLE FALLING OR STAYING ASLEEP OR SLEEPING TOO MUCH: SEVERAL DAYS
SUM OF ALL RESPONSES TO PHQ9 QUESTIONS 1 AND 2: 1
6. FEELING BAD ABOUT YOURSELF - OR THAT YOU ARE A FAILURE OR HAVE LET YOURSELF OR YOUR FAMILY DOWN: NOT AL ALL

## 2019-09-12 ASSESSMENT — ENCOUNTER SYMPTOMS
DIARRHEA: 0
ABDOMINAL PAIN: 0
CONSTIPATION: 0
FOCAL WEAKNESS: 0
FEVER: 0
CHILLS: 0
VOMITING: 0
SHORTNESS OF BREATH: 0
SENSORY CHANGE: 1
NAUSEA: 0
PALPITATIONS: 0

## 2019-09-12 ASSESSMENT — PAIN SCALES - GENERAL: PAIN_LEVEL: 4

## 2019-09-12 NOTE — PROGRESS NOTES
69yoM with neuropathy, HTN, gout.  Has right nondisplaced femoral neck fracture s/p ORIF 9/12.    S: Hip feels better postop    O:    Vitals:    09/12/19 1200   BP: 118/71   Pulse: 77   Resp: 16   Temp: 36.7 °C (98.1 °F)   SpO2: 98%     Exam:  General-NAD, alert and following commands  RLE- +EHL/FHL/TA/GS motor, diminished sensation in foot, palp dp pulse, hip dressing c/d/i    A: 69yoM with neuropathy, HTN, gout.  Has right nondisplaced femoral neck fracture s/p ORIF 9/12.    Recs:  --TTWB RLE  --ancef x 2 doses postop  --PT/OT for mobilization ASAP  --okay to start lovenox or equivalent tomorrow am  --consider workup for complaints of lower extremity workup and balance issues while inpatient  --fu 2 weeks postop

## 2019-09-12 NOTE — OR SURGEON
Immediate Post OP Note    PreOp Diagnosis: Right femoral neck fracture    PostOp Diagnosis: same    Procedure(s):  ORIF, HIP - Wound Class: Clean    Surgeon(s):  Darrius Barros M.D.    Anesthesiologist/Type of Anesthesia:  Anesthesiologist: Sanchez Galarza M.D./General    Surgical Staff:  Circulator: Shweta Stratton R.N.; Patricia Cotto R.N.  Scrub Person: Oralia Helton  Radiology Technologist: Patricia Thapa    Specimens removed if any:  * No specimens in log *    Estimated Blood Loss: 100cc    Findings: see dictation    Complications: none known    Plan:  --readmit medicine postop  --TTWB RLE  --ancef x 2 doses postop  --PT/OT for mobilization ASAP  --okay to start lovenox or equivalent tomorrow am        9/12/2019 9:15 AM Darrius Barros M.D.

## 2019-09-12 NOTE — ANESTHESIA PROCEDURE NOTES
Airway  Date/Time: 9/12/2019 7:36 AM  Performed by: Sanchez Galarza M.D.  Authorized by: Sanchez Galarza M.D.     Location:  OR  Urgency:  Elective  Indications for Airway Management:  Anesthesia  Spontaneous Ventilation: absent    Sedation Level:  Deep  Preoxygenated: Yes    Mask Difficulty Assessment:  1 - vent by mask  Final Airway Type:  Supraglottic airway  Final Supraglottic Airway:  Standard LMA  SGA Size:  4  Number of Attempts at Approach:  1

## 2019-09-12 NOTE — PROGRESS NOTES
Received return page from . Updated him about recent lactic acid of 3.1.  stated he would put in an order for a repeat lactic in 6 hours, and initiate a 500ml bolus.

## 2019-09-12 NOTE — CONSULTS
DATE OF SERVICE:  09/11/2019    ORTHOPEDIC CONSULTATION    REQUESTING PHYSICIAN:  Kris Cabral MD, emergency department.    REASON FOR CONSULTATION:  Right nondisplaced femoral neck fracture.    CHIEF COMPLAINT:  Right hip pain.    HISTORY OF PRESENT ILLNESS:  The patient is a 69-year-old male.  He states he   has had trouble walking lately due to numbness in his lower extremities and   some balance issues.  He does have a history of peripheral neuropathy,   hypertension, gastroesophageal reflux disease, and states he had a ground   level fall due to losing his balance at home.  He states that this numbness in   his extremities, tingling, pain and weakness has lasted for several months   and he has been having this looked into by his primary care provider on an   outpatient basis.  He presented to the emergency department today after his   fall and complaining of right hip pain.  He is being evaluated for admission   to the hospitalist service and I was consulted by Dr. Cabral to provide   treatment recommendations for what was found to be a right nondisplaced   femoral neck fracture on plain x-rays upon his arrival.  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    PAST MEDICAL HISTORY:   ALLERGIES:  No known drug allergies.    OUTPATIENT MEDICATIONS:  Include multivitamin, clonidine, carvedilol, Norco,   Prilosec, probenecid, allopurinol, gabapentin, and diclofenac.    PAST MEDICAL DIAGNOSES:  Include gout, hypertension, peripheral neuropathy,   gastroesophageal reflux disease, back pain.    PAST SURGICAL HISTORY:  Exploratory laparotomy in 2016.    FAMILY HISTORY:  Negative for significant medical problems.    SOCIAL HISTORY:  Patient denies smoking, denies illicit drug use.  He drinks 1   glass of zeeshan nightly.    REVIEW OF SYSTEMS:  He denies fevers, chills, nausea, vomiting, shortness of   breath, chest pain, otherwise normal per AMA  criteria other than that already   stated in the HPI.    PHYSICAL EXAMINATION:  VITAL SIGNS:  Temperature is 97.9, heart rate is 76, blood pressure 122/71,   pulse oximetry is 96% on room air.  GENERAL APPEARANCE:  Patient is alert, oriented.  He is pleasant and   cooperative and in no acute distress, lying supine in [facility].  HEAD, EYES, EARS, NOSE AND THROAT:  Normocephalic and atraumatic.  Mucous   membranes are moist.  PULMONARY:  Symmetric, unlabored breathing.  CARDIOVASCULAR:  Extremities well perfused.  ABDOMEN:  Not obviously distended.  MUSCULOSKELETAL:  Right lower extremity, he does have pain with gentle log   roll to the right lower extremity, localized to his groin.  He is nontender to   palpation at the knee.  He has diminished sensation to light touch in his   feet, but he is able to dorsi and plantarflex his feet and flex and extend his   toes.  He has palpable dorsalis pedis pulses.  He has no pain with log roll   to the left lower extremity.  He is nontender to palpation of the left knee.    Bilateral upper extremities are grossly neurovascularly intact without   evidence of obvious traumatic deformity other than some ecchymosis and   tenderness to palpation of the right elbow and some limited active range of   motion.    DIAGNOSTIC IMAGING:  Plain x-rays including an AP pelvis and 2 views of the   right hip show a nondisplaced femoral neck fracture.  Plain x-rays of the   right elbow show evidence of obvious fracture.  There is an elbow effusion   present, potentially consistent with an occult fracture.    ASSESSMENT:  A 69-year-old male with history of peripheral neuropathy, gout   and hypertension who had a mechanical fall due to recent balance issues and   has a right nondisplaced femoral neck fracture.  He also has a right traumatic   elbow effusion potentially consistent with an occult fracture about the   elbow.    RECOMMENDATIONS:  1.  The patient is being evaluated for admission  to the hospitalist service.  2.  I discussed these findings with the patient.  I recommend stabilization of   his nondisplaced femoral neck fracture to prevent displacement and the need   for hip arthroplasty.  3.  I recommend percutaneous fixation of his femoral neck fracture, which I   could coordinate for tomorrow morning.  4.  I recommend he be nonweightbearing to the right lower extremity and likely   bed rest overnight.  5.  He should be admitted made n.p.o. after midnight and I will try to make   preparations to get him to the operating room tomorrow morning for fixation of   his right nondisplaced femoral neck fracture.  We discussed the risks,   benefits, alternatives of surgery.  He expressed understanding and wishes to   proceed with surgery when possible.       ____________________________________     MD JOANNA Potter / CINDY    DD:  09/11/2019 19:13:01  DT:  09/11/2019 20:02:53    D#:  [id number]  Job#:  [id number]

## 2019-09-12 NOTE — PROGRESS NOTES
HOSPITAL MEDICINE PROGRESS NOTE    Date of service  9/12/2019    Chief Complaint  GLF and Weakness (chornic R sided weakness)      Hospital Course:    68 yo man with h/o peripheral neuropathy being work up outpt, p/w after a GLF with a left femur fracture.        Interval History Updates:  Hospital Day #Hospital Day: 2   No event overnight.  Afebrile.    ORIF this morning with Ortho  Pain controlled.      Consultants/Specialty  Orthopaedics    Review of Systems   Review of Systems   Constitutional: Negative for chills and fever.   Respiratory: Negative for shortness of breath.    Cardiovascular: Negative for chest pain, palpitations and leg swelling.   Gastrointestinal: Negative for abdominal pain, constipation, diarrhea, nausea and vomiting.   Musculoskeletal: Positive for joint pain.   Skin: Negative for rash.   Neurological: Positive for sensory change. Negative for focal weakness.        Has chronic peripheral neuropathy of LEs   Endo/Heme/Allergies: Negative.    All other systems reviewed and are negative.       Medications:  • Influenza Vaccine High-Dose pf  0.5 mL Once PRN   • ceFAZolin  2 g Q8HR   • [START ON 9/13/2019] heparin  5,000 Units Q8HRS   • morphine injection  4 mg Once   • ondansetron  4 mg Once   • senna-docusate  2 Tab BID    And   • polyethylene glycol/lytes  1 Packet QDAY PRN    And   • magnesium hydroxide  30 mL QDAY PRN    And   • bisacodyl  10 mg QDAY PRN   • NS   Continuous   • acetaminophen  650 mg Q6HRS PRN   • enalaprilat  1.25 mg Q6HRS PRN   • ondansetron  4 mg Q4HRS PRN   • Pharmacy Consult Request  1 Each PHARMACY TO DOSE    And   • oxyCODONE immediate-release  5 mg Q3HRS PRN    And   • oxyCODONE immediate-release  10 mg Q3HRS PRN    And   • HYDROmorphone  0.5 mg Q3HRS PRN   • allopurinol  300 mg DAILY   • carvedilol  25 mg BID   • [START ON 9/13/2019] cloNIDine  1 Patch Q FRIDAY   • gabapentin  200 mg TID   • omeprazole  40 mg BID   • thiamine  100 mg DAILY       Physical Exam    Vitals:    09/12/19 1031 09/12/19 1100 09/12/19 1130 09/12/19 1200   BP: 126/74 126/75 123/70 118/71   Pulse: 79 81 80 77   Resp: 16 16 17 16   Temp: 36.8 °C (98.2 °F) 37.2 °C (98.9 °F) 36.9 °C (98.5 °F) 36.7 °C (98.1 °F)   TempSrc: Temporal Temporal Temporal Temporal   SpO2: 97% 97% 100% 98%   Weight:       Height:           Physical Exam   Constitutional: He is oriented to person, place, and time and well-developed, well-nourished, and in no distress. No distress.   HENT:   Head: Normocephalic and atraumatic.   Eyes: Pupils are equal, round, and reactive to light. Conjunctivae are normal. No scleral icterus.   Neck: Normal range of motion. Neck supple. No JVD present.   Cardiovascular: Normal rate, regular rhythm and normal heart sounds. Exam reveals no gallop and no friction rub.   No murmur heard.  Pulmonary/Chest: Effort normal and breath sounds normal. No respiratory distress. He has no wheezes. He has no rales. He exhibits no tenderness.   Abdominal: Soft. Bowel sounds are normal. There is no tenderness. There is no rebound.   Musculoskeletal: Normal range of motion. He exhibits tenderness. He exhibits no edema or deformity.   Neurological: He is alert and oriented to person, place, and time.   Sensation to light touch is decreased at the distal feet.  This is chronic.   Skin: Skin is warm and dry. No rash noted. He is not diaphoretic. No erythema.   Psychiatric: Mood and affect normal.   Nursing note and vitals reviewed.       Recent Labs     09/11/19  1544 09/12/19 0455   WBC 8.7 9.4   RBC 3.43* 3.51*   HEMOGLOBIN 12.2* 12.6*   HEMATOCRIT 36.7* 37.7*   .0* 107.4*   MCH 35.6* 35.9*   MCHC 33.2* 33.4*   RDW 61.1* 61.1*   PLATELETCT 103* 111*   MPV 12.2 10.7      Laboratory   Recent Labs     09/11/19  1544 09/12/19 0455   WBC 8.7 9.4   RBC 3.43* 3.51*   HEMOGLOBIN 12.2* 12.6*   HEMATOCRIT 36.7* 37.7*   PLATELETCT 103* 111*     Recent Labs     09/11/19  1714   SODIUM 140   POTASSIUM 3.8   CHLORIDE  109   CO2 16*   GLUCOSE 84   BUN 17   CREATININE 1.04      Recent Labs     09/11/19  1714   ALTSGPT 10   ASTSGOT 15   ALKPHOSPHAT 105*   TBILIRUBIN 0.5   GLUCOSE 84      Recent Labs     09/11/19  1714   APTT 28.7   INR 1.08      Lactic acid 3.0 -> 1.0.       No results found for: BLOODCULTU, BLDCULT, BCHOLD      Labs reviewed by me and noted above.    Radiology  DX-PORTABLE FLUORO > 1 HOUR  Narrative: 9/12/2019 7:30 AM    HISTORY/REASON FOR EXAM:  Right hip ORIF.    TECHNIQUE/EXAM DESCRIPTION AND NUMBER OF VIEWS:  Portable fluoroscopy for greater than one hour in OR.    FINDINGS:  The portable fluoroscopy unit was obligated to the procedure for greater than one hour. Actual fluoro time was 48 seconds.  Impression: Portable fluoroscopy utilized for 48 seconds.    INTERPRETING LOCATION: [address] [place] [address], [facility]  DX-HIP-UNILATERAL-W/O PELVIS-2/3 VIEWS RIGHT  Narrative: 9/12/2019 7:30 AM    HISTORY/REASON FOR EXAM:  Right hip ORIF    TECHNIQUE/EXAM DESCRIPTION AND NUMBER OF VIEWS:  3 views of the RIGHT hip. Digitized Intraoperative Radiograph    FINDINGS: Fixation hardware projecting over the femur    Fluoroscopic time:48 seconds, total dose 5.17 mGy  Impression: Digitized intraoperative radiograph is submitted for review.  This examination is not for diagnostic purpose but for guidance during a surgical procedure.    MRI L-spine 6/28/19:  1.  L4-5 mild spinal canal narrowing without cornelio spinal stenosis  2.  Foraminal stenoses at L3-4, L4-5, and L5-S1  3.  Multilevel facet arthropathy, disc bulge, and/or disc protrusion     Results for orders placed or performed during the hospital encounter of 04/27/16   ECHOCARDIOGRAM COMP W/O CONT   Result Value Ref Range    Eject.Frac. MOD BP 57.42     Eject.Frac. MOD 4C 60.63     Eject.Frac. MOD 2C 56.76     Left Ventrical Ejection Fraction 60           Assessment and Plan    Principal Problem:    Closed fracture of neck of right femur (HCC) POA: Yes.  S/p ORIF.  Ortho  following.  PT and OT to eval.  Pain controlled.        Fall from ground level POA: Yes.  Likely due to peripheral neuropathy that is chronic.  PT OT eval.        Macrocytosis (new)  POA: Yes.  Given his h/o peripheral neuropathy, ?vitamin deficiency.  I order serum Vit B12, thiamine, folic acid screening.        Lactic acidosis POA: Yes.  Like from the fall.  Resolved with IVF.  Lactic now 1.0.  Cont IVF and monitor.  No sign of active infection.      Peripheral neuropathy POA: Yes.  Followed by Dr. Zaheer Elliott outpatient.  MRI L-spine in 06/19 as reviewed above; no sign of significant stenosis.  Pt should continue to f/u with Dr. Elliott after discharge for this acute episode.  The macrocytosis raises concern for vitamin deficiency as a cause.  Work up as above.  Additionally, check DAMIEN, ESR.        Hypertension POA: Yes.  Cont clonidine patch qWeekly.      H/o esophageal stricture:  Tolerating foods OK without chest pain.  Pills need to be crushed.  Monitor for achalasia Sx.  If needed, will consult GI for EGd/dilation.    DVT prophylaxis: SCD.  Will start Lovenox tomorrow per Ortho.    CODE STATUS:  FULL CODE    Disposition: Anticipate SNF in 2-3 days.    Case was discussed with Primary RN and Charge Nurse, Medical SW, , and Pharmacist on IDTround in addition to individual(s) mentioned above.    I have performed a physical exam and reviewed and updated ROS as of today, 9/12/2019.  In review of yesterday's note dated, 9/11/2019, there are no changes except as documented above.      Merari Flores M.D.

## 2019-09-12 NOTE — ANESTHESIA TIME REPORT
Anesthesia Start and Stop Event Times     Date Time Event    9/12/2019 0722 Ready for Procedure     0730 Anesthesia Start     0900 Anesthesia Stop        Responsible Staff  09/12/19    Name Role Begin End    Sanchez Gaalrza M.D. Anesth 0730 0900        Preop Diagnosis (Free Text):  Pre-op Diagnosis     Right nondisplaced femoral neck fracture.        Preop Diagnosis (Codes):    Post op Diagnosis  Fracture of femoral neck, right, closed (HCC)      Premium Reason  Non-Premium    Comments:

## 2019-09-12 NOTE — ANESTHESIA PREPROCEDURE EVALUATION
Relevant Problems   CARDIAC   (+) Hypertension   (+) Hypertensive urgency         (+) RUBY (acute kidney injury) (HCC)       Physical Exam    Airway   Mallampati: III  TM distance: >3 FB  Neck ROM: full       Cardiovascular - normal exam  Rhythm: regular  Rate: normal  (-) murmur     Dental   Comments: Crowns, fairly poor dentition, no loose       Pulmonary - normal exam  Breath sounds clear to auscultation     Abdominal    Neurological - normal exam               Anesthesia Plan    ASA 2       Plan - general       Airway plan will be LMA        Induction: intravenous    Postoperative Plan: Postoperative administration of opioids is intended.    Pertinent diagnostic labs and testing reviewed    Informed Consent:    Anesthetic plan and risks discussed with patient.    Use of blood products discussed with: patient whom consented to blood products.

## 2019-09-12 NOTE — OP REPORT
DATE OF SERVICE:  09/12/2019    PREOPERATIVE DIAGNOSIS:  Right nondisplaced femoral neck fracture.    POSTOPERATIVE DIAGNOSIS:  Right nondisplaced femoral neck fracture.    PROCEDURE PERFORMED:  Open treatment with internal fixation, right femoral   neck fracture.    SURGEON:  Darrius Barros MD    ANESTHESIOLOGIST:  Sanchez Galarza MD    ANESTHESIA:  General.    ESTIMATED BLOOD LOSS:  100 mL    IMPLANTS:  Synthes FNS with a 100 mm bolt and anti-rotation screw, a 2-hole   plate and two 5.0 mm locking screws.    INDICATION FOR PROCEDURE:  The patient is a 69-year-old male.  He had a fall   due to balance issues that he has been dealing with lately and was found to   have a right nondisplaced femoral neck fracture.  He was admitted to the   hospitalist service yesterday.  I was consulted to provide treatment   recommendations for his nondisplaced right femoral neck fracture.  He does   have some underlying degenerative changes in the hip, but no preexisting   symptoms from that.  I recommended surgical stabilization of his nondisplaced   fracture and he signed informed consent and wished to proceed as outlined   above.    DESCRIPTION OF PROCEDURE:  The patient was met in the preop holding area and   his surgical site was signed.  His consent was confirmed to be accurate.  He   was taken back to the operating room and general anesthesia was induced.  He   was positioned on the fracture table in supine position.  His right lower   extremity was just suspended in the traction boot, no traction was applied.    Left lower extremity was suspended in a traction boot in extension.    Fluoroscopic imaging confirmed acceptable alignment of the fracture on AP and   lateral fluoroscopic imaging.  The right thigh was then prepped and draped in   the usual sterile fashion.  A formal timeout was performed to confirm   patient's correct name, correct surgical site, correct procedure and correct   laterality.  A lateral incision  was made and centered over the   intertrochanteric femur area with scalpel down through skin.  Dissection was   carried with Bovie cautery down through the iliotibial band.  The vastus   lateralis muscle was split longitudinally and exposed the lateral cortex of   the femur.  I used a guide for the 130-degree FNS and placed a guidepin at   appropriate position in the femoral head, confirmed on AP and lateral   fluoroscopic imaging.  I then measured and prepared for a 100 mm bolt and a   2-hole plate, which I inserted with the insertion instrumentation.  I then   placed a 100 mm anti-rotation screw, which had excellent purchase in the bone   within the femoral head.  I then confirmed the plate was well positioned and   placed 2 locking screws through the plate using the insertion instrumentation   as well.  All the insertion instrumentation was removed.  Final fluoroscopic   imaging confirmed overall acceptable alignment of the fracture and acceptable   position of the implants.  The wound was thoroughly irrigated with normal   saline.  I reapproximated the vastus lateralis fascia with #1 Vicryl, IT band   with #1 Vicryl, subQ layers with 0 Vicryl, 2-0 Vicryl, and skin edges with   staples.  The wound was cleansed, dried, and sterile occlusive dressing was   applied.  He was then taken out of the traction boot, transferred on the   [place], awoken from anesthesia and taken to postanesthesia care unit in stable   condition.    PLAN:  1.  The patient will be readmitted to the medical service postop.  2.  He should be toe-touch weightbearing to the right lower extremity.  3.  He will need Ancef for 2 doses postop for infection prophylaxis.  4.  He should work with physical and occupational therapy as soon as possible   for mobilization.  5.  He will be started on Lovenox or equivalent tomorrow morning and should   continue SCDs for DVT prophylaxis.       ____________________________________     Darrius Barros,  MD [facility] / CINDY    DD:  09/12/2019 09:21:55  DT:  09/12/2019 09:55:20    D#:  [id number]  Job#:  [id number]

## 2019-09-12 NOTE — CARE PLAN
Problem: Communication  Goal: The ability to communicate needs accurately and effectively will improve  Outcome: PROGRESSING AS EXPECTED  Intervention: Educate patient and significant other/support system about the plan of care, procedures, treatments, medications and allow for questions  Note:   Patient updated about plan of care including plan for procedure tomorrow morning as well as medications to be administered. Answered all questions accordingly patient verbalizes understanding.      Problem: Pain Management  Goal: Pain level will decrease to patient's comfort goal  Outcome: PROGRESSING AS EXPECTED  Intervention: Educate and implement non-pharmacologic comfort measures. Examples: relaxation, distration, play therapy, activity therapy, massage, etc.  Note:   Patient aware of pharmacological interventions available, verbalizes understanding. Education about non-pharmacological interventions provided, patient verbalizes understanding.

## 2019-09-12 NOTE — PROGRESS NOTES
69yoM with neuropathy, HTN, gout.  Has right nondisplaced femoral neck fracture.    S: NPO awaiting surgery.    O:    Vitals:    09/12/19 0654   BP: 129/81   Pulse: (!) 111   Resp: 18   Temp: 37.1 °C (98.7 °F)   SpO2: 97%     Exam:  General-NAD, alert and following commands  RLE- +EHL/FHL/TA/GS motor, diminished sensation in foot, palp dp pulse    A: 69yoM with neuropathy, HTN, gout.  Has right nondisplaced femoral neck fracture.    Recs:  --Planning fixation of right femoral neck fracture today.  Discussed risks, benefits and alternatives to surgery.  Discussed risk of nonunion, loss of fixation requiring further surgery (arthroplasty) and general risks of anesthesia.  He wishes to proceed with surgery this am.

## 2019-09-12 NOTE — ANESTHESIA POSTPROCEDURE EVALUATION
Patient: Cheel Beasley    Procedure Summary     Date:  09/12/19 [place] / [place] [address] [place] [facility] [place]esthesia Start:  0730 Anesthesia Stop:  0900    Procedure:  ORIF, HIP (Right Hip) Diagnosis:  (Right nondisplaced femoral neck fracture.)    Surgeon:  Darrius Barros M.D. Responsible Provider:  Sanchez Galarza M.D.    Anesthesia Type:  general ASA Status:  2          Final Anesthesia Type: general  Last vitals  BP   Blood Pressure : 129/78    Temp   36.5 °C (97.7 °F)    Pulse   Pulse: 85   Resp   14    SpO2   100 %      Anesthesia Post Evaluation    Patient location during evaluation: PACU  Patient participation: complete - patient participated  Level of consciousness: awake and alert  Pain score: 4    Airway patency: patent  Anesthetic complications: no  Cardiovascular status: hemodynamically stable  Respiratory status: acceptable  Hydration status: euvolemic    PONV: none           Nurse Pain Score: 4 (NPRS)

## 2019-09-12 NOTE — CARE PLAN
Problem: Communication  Goal: The ability to communicate needs accurately and effectively will improve  Outcome: PROGRESSING AS EXPECTED     Problem: Safety  Goal: Will remain free from injury  Outcome: PROGRESSING AS EXPECTED  Goal: Will remain free from falls  Outcome: PROGRESSING AS EXPECTED  Note:   Bed locked in lowest position with two side rails up and the bed alarm on;frequent rounding done     Problem: Pain Management  Goal: Pain level will decrease to patient's comfort goal  Outcome: PROGRESSING AS EXPECTED  Note:   Prn pain medications ordered

## 2019-09-13 LAB
ANION GAP SERPL CALC-SCNC: 11 MMOL/L (ref 0–11.9)
BUN SERPL-MCNC: 21 MG/DL (ref 8–22)
CALCIUM SERPL-MCNC: 8 MG/DL (ref 8.5–10.5)
CHLORIDE SERPL-SCNC: 112 MMOL/L (ref 96–112)
CO2 SERPL-SCNC: 18 MMOL/L (ref 20–33)
CREAT SERPL-MCNC: 0.92 MG/DL (ref 0.5–1.4)
ERYTHROCYTE [DISTWIDTH] IN BLOOD BY AUTOMATED COUNT: 58.2 FL (ref 35.9–50)
ERYTHROCYTE [SEDIMENTATION RATE] IN BLOOD BY WESTERGREN METHOD: 10 MM/HOUR (ref 0–20)
FOLATE SERPL-MCNC: 7 NG/ML
GLUCOSE SERPL-MCNC: 180 MG/DL (ref 65–99)
HCT VFR BLD AUTO: 31.4 % (ref 42–52)
HGB BLD-MCNC: 10.3 G/DL (ref 14–18)
MCH RBC QN AUTO: 35 PG (ref 27–33)
MCHC RBC AUTO-ENTMCNC: 32.8 G/DL (ref 33.7–35.3)
MCV RBC AUTO: 106.8 FL (ref 81.4–97.8)
PLATELET # BLD AUTO: 127 K/UL (ref 164–446)
PMV BLD AUTO: 11.9 FL (ref 9–12.9)
POTASSIUM SERPL-SCNC: 4.1 MMOL/L (ref 3.6–5.5)
RBC # BLD AUTO: 2.94 M/UL (ref 4.7–6.1)
SODIUM SERPL-SCNC: 141 MMOL/L (ref 135–145)
VIT B12 SERPL-MCNC: 591 PG/ML (ref 211–911)
WBC # BLD AUTO: 14.1 K/UL (ref 4.8–10.8)

## 2019-09-13 PROCEDURE — 82607 VITAMIN B-12: CPT

## 2019-09-13 PROCEDURE — 36415 COLL VENOUS BLD VENIPUNCTURE: CPT

## 2019-09-13 PROCEDURE — 99232 SBSQ HOSP IP/OBS MODERATE 35: CPT | Performed by: INTERNAL MEDICINE

## 2019-09-13 PROCEDURE — 90471 IMMUNIZATION ADMIN: CPT

## 2019-09-13 PROCEDURE — 85027 COMPLETE CBC AUTOMATED: CPT

## 2019-09-13 PROCEDURE — 700111 HCHG RX REV CODE 636 W/ 250 OVERRIDE (IP): Performed by: ORTHOPAEDIC SURGERY

## 2019-09-13 PROCEDURE — 85652 RBC SED RATE AUTOMATED: CPT

## 2019-09-13 PROCEDURE — 90662 IIV NO PRSV INCREASED AG IM: CPT | Performed by: INTERNAL MEDICINE

## 2019-09-13 PROCEDURE — A9270 NON-COVERED ITEM OR SERVICE: HCPCS | Performed by: INTERNAL MEDICINE

## 2019-09-13 PROCEDURE — 770006 HCHG ROOM/CARE - MED/SURG/GYN SEMI*

## 2019-09-13 PROCEDURE — 82746 ASSAY OF FOLIC ACID SERUM: CPT

## 2019-09-13 PROCEDURE — 86038 ANTINUCLEAR ANTIBODIES: CPT

## 2019-09-13 PROCEDURE — 97162 PT EVAL MOD COMPLEX 30 MIN: CPT

## 2019-09-13 PROCEDURE — 80048 BASIC METABOLIC PNL TOTAL CA: CPT

## 2019-09-13 PROCEDURE — 84425 ASSAY OF VITAMIN B-1: CPT

## 2019-09-13 PROCEDURE — 3E02340 INTRODUCTION OF INFLUENZA VACCINE INTO MUSCLE, PERCUTANEOUS APPROACH: ICD-10-PCS | Performed by: INTERNAL MEDICINE

## 2019-09-13 PROCEDURE — 700111 HCHG RX REV CODE 636 W/ 250 OVERRIDE (IP): Performed by: INTERNAL MEDICINE

## 2019-09-13 PROCEDURE — 97165 OT EVAL LOW COMPLEX 30 MIN: CPT

## 2019-09-13 PROCEDURE — 700102 HCHG RX REV CODE 250 W/ 637 OVERRIDE(OP): Performed by: INTERNAL MEDICINE

## 2019-09-13 RX ADMIN — GABAPENTIN 200 MG: 100 CAPSULE ORAL at 17:11

## 2019-09-13 RX ADMIN — INFLUENZA A VIRUS A/MICHIGAN/45/2015 X-275 (H1N1) ANTIGEN (FORMALDEHYDE INACTIVATED), INFLUENZA A VIRUS A/SINGAPORE/INFIMH-16-0019/2016 IVR-186 (H3N2) ANTIGEN (FORMALDEHYDE INACTIVATED), AND INFLUENZA B VIRUS B/MARYLAND/15/2016 BX-69A (A B/COLORADO/6/2017-LIKE VIRUS) ANTIGEN (FORMALDEHYDE INACTIVATED) 0.5 ML: 60; 60; 60 INJECTION, SUSPENSION INTRAMUSCULAR at 22:17

## 2019-09-13 RX ADMIN — SENNOSIDES, DOCUSATE SODIUM 2 TABLET: 50; 8.6 TABLET, FILM COATED ORAL at 06:13

## 2019-09-13 RX ADMIN — GABAPENTIN 200 MG: 100 CAPSULE ORAL at 12:42

## 2019-09-13 RX ADMIN — ALLOPURINOL 300 MG: 300 TABLET ORAL at 06:14

## 2019-09-13 RX ADMIN — HEPARIN SODIUM 5000 UNITS: 5000 INJECTION INTRAVENOUS; SUBCUTANEOUS at 06:13

## 2019-09-13 RX ADMIN — HEPARIN SODIUM 5000 UNITS: 5000 INJECTION INTRAVENOUS; SUBCUTANEOUS at 22:16

## 2019-09-13 RX ADMIN — CLONIDINE 1 PATCH: 0.3 PATCH TRANSDERMAL at 19:24

## 2019-09-13 RX ADMIN — CARVEDILOL 25 MG: 25 TABLET, FILM COATED ORAL at 06:13

## 2019-09-13 RX ADMIN — GABAPENTIN 200 MG: 100 CAPSULE ORAL at 06:13

## 2019-09-13 RX ADMIN — OXYCODONE HYDROCHLORIDE 10 MG: 10 TABLET ORAL at 12:45

## 2019-09-13 RX ADMIN — HEPARIN SODIUM 5000 UNITS: 5000 INJECTION INTRAVENOUS; SUBCUTANEOUS at 15:15

## 2019-09-13 RX ADMIN — CEFAZOLIN 2 G: 10 INJECTION, POWDER, FOR SOLUTION INTRAVENOUS at 00:34

## 2019-09-13 RX ADMIN — OMEPRAZOLE 40 MG: 20 CAPSULE, DELAYED RELEASE ORAL at 17:12

## 2019-09-13 RX ADMIN — CARVEDILOL 25 MG: 25 TABLET, FILM COATED ORAL at 17:11

## 2019-09-13 RX ADMIN — Medication 100 MG: at 06:14

## 2019-09-13 RX ADMIN — SENNOSIDES, DOCUSATE SODIUM 2 TABLET: 50; 8.6 TABLET, FILM COATED ORAL at 17:11

## 2019-09-13 RX ADMIN — OMEPRAZOLE 40 MG: 20 CAPSULE, DELAYED RELEASE ORAL at 06:14

## 2019-09-13 ASSESSMENT — COGNITIVE AND FUNCTIONAL STATUS - GENERAL
SUGGESTED CMS G CODE MODIFIER MOBILITY: CN
DAILY ACTIVITIY SCORE: 15
TURNING FROM BACK TO SIDE WHILE IN FLAT BAD: UNABLE
MOBILITY SCORE: 6
MOVING TO AND FROM BED TO CHAIR: UNABLE
MOVING FROM LYING ON BACK TO SITTING ON SIDE OF FLAT BED: UNABLE
WALKING IN HOSPITAL ROOM: TOTAL
DRESSING REGULAR UPPER BODY CLOTHING: A LOT
TOILETING: A LOT
STANDING UP FROM CHAIR USING ARMS: TOTAL
DRESSING REGULAR LOWER BODY CLOTHING: A LOT
PERSONAL GROOMING: A LITTLE
HELP NEEDED FOR BATHING: A LOT
SUGGESTED CMS G CODE MODIFIER DAILY ACTIVITY: CK
CLIMB 3 TO 5 STEPS WITH RAILING: TOTAL

## 2019-09-13 ASSESSMENT — ENCOUNTER SYMPTOMS
VOMITING: 0
CHILLS: 0
CONSTIPATION: 0
FEVER: 0
NAUSEA: 0
SENSORY CHANGE: 1
DIARRHEA: 0
PALPITATIONS: 0
FOCAL WEAKNESS: 0
SHORTNESS OF BREATH: 0
ABDOMINAL PAIN: 0

## 2019-09-13 ASSESSMENT — ACTIVITIES OF DAILY LIVING (ADL): TOILETING: INDEPENDENT

## 2019-09-13 ASSESSMENT — GAIT ASSESSMENTS: GAIT LEVEL OF ASSIST: UNABLE TO PARTICIPATE

## 2019-09-13 NOTE — THERAPY
"Pt is 68 y/o male admitted following GLF with subsequent R femoral neck fx, now s/p ORIF and TTWB R LE. Pt presents to acute PT with impairments in functional mobility, balance, gait, strength, and activity tolerance due to post op pain. Pt very stiff through R LE; however, did tolerate seated AP, HS and small marching while at EOB. Attempted sit <> stands x2 trials and pt unable to complete with Max PT assist and bed height raised. Pt will benefit from acute PT interventions to address present impairments. At this time, recommend post acute placement prior to DC home.    Physical Therapy Evaluation completed.   Bed Mobility:  Supine to Sit: Moderate Assist  Transfers: Sit to Stand: Unable to Participate  Gait: Level Of Assist: Unable to Participate        Plan of Care: Will benefit from Physical Therapy 4 times per week  Discharge Recommendations: Equipment: Will Continue to Assess for Equipment Needs.   Post-acute therapy: Recommend post-acute placement for continued physical therapy services prior to discharge home. Patient can tolerate post-acute therapies at a 5x/week frequency.         See \"Rehab Therapy-Acute\" Patient Summary Report for complete documentation.          "

## 2019-09-13 NOTE — PROGRESS NOTES
69yoM with neuropathy, HTN, gout.  Has right nondisplaced femoral neck fracture s/p ORIF 9/12.    S: doing okay    O:    Vitals:    09/13/19 0832   BP: (!) 86/46   Pulse: 78   Resp: 17   Temp: 36.2 °C (97.1 °F)   SpO2: 95%     Exam:  General-NAD, alert and following commands  RLE- +EHL/FHL/TA/GS motor, diminished sensation in foot, palp dp pulse, hip dressing c/d/i    Hct: 31.4    A: 69yoM with neuropathy, HTN, gout.  Has right nondisplaced femoral neck fracture s/p ORIF 9/12.    Recs:  --TTWB RLE  --PT/OT for mobilization   --continue heparin and SCDs  --hip dressing change today  --fu 2 weeks postop

## 2019-09-13 NOTE — PROGRESS NOTES
HOSPITAL MEDICINE PROGRESS NOTE    Date of service  9/13/2019    Chief Complaint  GLF and Weakness (chornic R sided weakness)      Hospital Course:    68 yo man with h/o peripheral neuropathy being work up outpt, p/w after a GLF with a left femur fracture.        Interval History Updates:  Hospital Day #Hospital Day: 3   No event overnight.  Afebrile.        Consultants/Specialty  Orthopaedics    Review of Systems   Review of Systems   Constitutional: Negative for chills and fever.   Respiratory: Negative for shortness of breath.    Cardiovascular: Negative for chest pain, palpitations and leg swelling.   Gastrointestinal: Negative for abdominal pain, constipation, diarrhea, nausea and vomiting.   Musculoskeletal: Positive for joint pain.   Skin: Negative for rash.   Neurological: Positive for sensory change. Negative for focal weakness.   Endo/Heme/Allergies: Negative.    All other systems reviewed and are negative.       Medications:  • Influenza Vaccine High-Dose pf  0.5 mL Once PRN   • heparin  5,000 Units Q8HRS   • senna-docusate  2 Tab BID    And   • polyethylene glycol/lytes  1 Packet QDAY PRN    And   • magnesium hydroxide  30 mL QDAY PRN    And   • bisacodyl  10 mg QDAY PRN   • NS   Continuous   • acetaminophen  650 mg Q6HRS PRN   • enalaprilat  1.25 mg Q6HRS PRN   • ondansetron  4 mg Q4HRS PRN   • Pharmacy Consult Request  1 Each PHARMACY TO DOSE    And   • oxyCODONE immediate-release  5 mg Q3HRS PRN    And   • oxyCODONE immediate-release  10 mg Q3HRS PRN    And   • HYDROmorphone  0.5 mg Q3HRS PRN   • allopurinol  300 mg DAILY   • carvedilol  25 mg BID   • cloNIDine  1 Patch Q FRIDAY   • gabapentin  200 mg TID   • omeprazole  40 mg BID   • thiamine  100 mg DAILY       Physical Exam   Vitals:    09/13/19 0050 09/13/19 0451 09/13/19 0832 09/13/19 1245   BP: 128/76 126/74 (!) 86/46 112/79   Pulse: 84 82 78    Resp: 18 18 17    Temp: 36.2 °C (97.1 °F) 36.4 °C (97.6 °F) 36.2 °C (97.1 °F)    TempSrc: Temporal  Temporal Temporal    SpO2: 96% 96% 95%    Weight:       Height:           Physical Exam   Constitutional: He is oriented to person, place, and time and well-developed, well-nourished, and in no distress. No distress.   HENT:   Head: Normocephalic and atraumatic.   Eyes: Pupils are equal, round, and reactive to light. Conjunctivae are normal. No scleral icterus.   Neck: Normal range of motion. Neck supple. No JVD present.   Cardiovascular: Normal rate, regular rhythm and normal heart sounds. Exam reveals no gallop and no friction rub.   No murmur heard.  Pulmonary/Chest: Effort normal and breath sounds normal. No respiratory distress. He has no wheezes. He has no rales. He exhibits no tenderness.   Abdominal: Soft. Bowel sounds are normal. There is no tenderness. There is no rebound.   Musculoskeletal: Normal range of motion. He exhibits tenderness. He exhibits no edema or deformity.   Right hip tender.  Dressing C/D/I.     Neurological: He is alert and oriented to person, place, and time.   Sensation to light touch is decreased at the distal feet.  This is chronic.   Skin: Skin is warm and dry. No rash noted. He is not diaphoretic. No erythema.   Psychiatric: Mood and affect normal.   Nursing note and vitals reviewed.       Recent Labs     09/11/19  1544 09/12/19 0455 09/13/19 0614   WBC 8.7 9.4 14.1*   RBC 3.43* 3.51* 2.94*   HEMOGLOBIN 12.2* 12.6* 10.3*   HEMATOCRIT 36.7* 37.7* 31.4*   .0* 107.4* 106.8*   MCH 35.6* 35.9* 35.0*   MCHC 33.2* 33.4* 32.8*   RDW 61.1* 61.1* 58.2*   PLATELETCT 103* 111* 127*   MPV 12.2 10.7 11.9      Laboratory   Recent Labs     09/11/19  1544 09/12/19 0455 09/13/19 0614   WBC 8.7 9.4 14.1*   RBC 3.43* 3.51* 2.94*   HEMOGLOBIN 12.2* 12.6* 10.3*   HEMATOCRIT 36.7* 37.7* 31.4*   PLATELETCT 103* 111* 127*     Recent Labs     09/11/19  1714 09/13/19  0614   SODIUM 140 141   POTASSIUM 3.8 4.1   CHLORIDE 109 112   CO2 16* 18*   GLUCOSE 84 180*   BUN 17 21   CREATININE 1.04 0.92       Recent Labs     09/11/19  1714 09/13/19  0614   ALTSGPT 10  --    ASTSGOT 15  --    ALKPHOSPHAT 105*  --    TBILIRUBIN 0.5  --    GLUCOSE 84 180*      Recent Labs     09/11/19  1714   APTT 28.7   INR 1.08      Lactic acid 3.0 -> 1.0.    Folic acid 7  Vit B12 591     No results found for: BLOODCULTU, BLDCULT, BCHOLD      Labs reviewed by me and noted above.    Radiology  DX-PORTABLE FLUORO > 1 HOUR  Narrative: 9/12/2019 7:30 AM    HISTORY/REASON FOR EXAM:  Right hip ORIF.    TECHNIQUE/EXAM DESCRIPTION AND NUMBER OF VIEWS:  Portable fluoroscopy for greater than one hour in OR.    FINDINGS:  The portable fluoroscopy unit was obligated to the procedure for greater than one hour. Actual fluoro time was 48 seconds.  Impression: Portable fluoroscopy utilized for 48 seconds.    INTERPRETING LOCATION: [address] [place] [address], [id number]  DX-HIP-UNILATERAL-W/O PELVIS-2/3 VIEWS RIGHT  Narrative: 9/12/2019 7:30 AM    HISTORY/REASON FOR EXAM:  Right hip ORIF    TECHNIQUE/EXAM DESCRIPTION AND NUMBER OF VIEWS:  3 views of the RIGHT hip. Digitized Intraoperative Radiograph    FINDINGS: Fixation hardware projecting over the femur    Fluoroscopic time:48 seconds, total dose 5.17 mGy  Impression: Digitized intraoperative radiograph is submitted for review.  This examination is not for diagnostic purpose but for guidance during a surgical procedure.    MRI L-spine 6/28/19:  1.  L4-5 mild spinal canal narrowing without cornelio spinal stenosis  2.  Foraminal stenoses at L3-4, L4-5, and L5-S1  3.  Multilevel facet arthropathy, disc bulge, and/or disc protrusion     Results for orders placed or performed during the hospital encounter of 04/27/16   ECHOCARDIOGRAM COMP W/O CONT   Result Value Ref Range    Eject.Frac. MOD BP 57.42     Eject.Frac. MOD 4C 60.63     Eject.Frac. MOD 2C 56.76     Left Ventrical Ejection Fraction 60           Assessment and Plan    Principal Problem:    Closed fracture of neck of right femur (HCC) POA: Yes.  S/p ORIF  9/12.  Ortho following.  PT and OT to eval.  Pain controlled.        Fall from ground level POA: Yes.  Likely due to peripheral neuropathy that is chronic.  PT OT eval.        Macrocytosis (new)  POA: Yes.  Serum Vit B12 and folic acid are normal.      Lactic acidosis POA: Yes.  Like from the fall.  Resolved with IVF.  Lactic now 1.0.  Cont IVF and monitor.  No sign of active infection.      Peripheral neuropathy POA: Yes.  Followed by Dr. Zaheer Elliott outpatient.  MRI L-spine in 06/19 as reviewed above; no sign of significant stenosis.  Pt should continue to f/u with Dr. Elliott after discharge for this acute episode.  DAMEIN that is pending.  ESR normal.        Hypertension POA: Yes.  Cont clonidine patch qWeekly.      H/o esophageal stricture:  Tolerating foods OK without chest pain.  Pills need to be crushed.  Monitor for achalasia Sx.  If needed, will consult GI for EGd/dilation.    DVT prophylaxis: SCD.  Will start Lovenox tomorrow per Ortho.    CODE STATUS:  FULL CODE    Disposition: Medically stable for discharge to SNF whenever available.      Case was discussed with Primary RN and Charge Nurse, Medical SW, , and Pharmacist on IDTround in addition to individual(s) mentioned above.    I have performed a physical exam and reviewed and updated ROS as of today, 9/13/2019.  In review of yesterday's note dated, 9/12/2019, there are no changes except as documented above.      Merari Flores M.D.

## 2019-09-13 NOTE — THERAPY
"Occupational Therapy Evaluation completed.   Functional Status: mod A supine>sit EOB, max A w/LB dressing, max A sit>stand fatigued easily. Good balance sitting EOB, set up w/lunch, alarm on and call light and tray table w/in reach   Plan of Care: Will benefit from Occupational Therapy 4 times per week  Discharge Recommendations:  Equipment: Will Continue to Assess for Equipment Needs. Post-acute therapy Recommend post-acute placement for additional occupational therapy services prior to discharge home. Patient can tolerate post-acute therapies at a 5x/week frequency..      See \"Rehab Therapy-Acute\" Patient Summary Report for complete documentation.      69 yr old male admitted for non-displaced femoral neck fracture s/p ORIF, pt is POD #1 TTWB, w/hx of HTN, goat and neuropathy. Pt is demonstrating generalized weakness and pain impacting his ability to mobilize and participate in ADL's. At this time recommend post acute placement prior to d/c home   "

## 2019-09-13 NOTE — PROGRESS NOTES
Patient SO called and was somewhat distraught stating she was worried about her SO mentation. After the phone call I went into room and sat with patient while we discussed his mentation. He was able to state the date, where he was, why he was here, and who he was. He talked about his home situation with the PT and myself along with information about his past. He stated he was having issues with dialing the phone and education was provided. Spoke again about situation with Hyacinth his SO and she felt more at ease. Patient did have an oxycodone earlier in the shift per MAR and pain scale.

## 2019-09-13 NOTE — CARE PLAN
Problem: Communication  Goal: The ability to communicate needs accurately and effectively will improve  Outcome: PROGRESSING AS EXPECTED  Patient educated to use call light to alert staff to needs. Patient expressed understanding.     Problem: Venous Thromboembolism (VTW)/Deep Vein Thrombosis (DVT) Prevention:  Goal: Patient will participate in Venous Thrombosis (VTE)/Deep Vein Thrombosis (DVT)Prevention Measures  Intervention: Encourage ambulation/mobilization at level directed by Physical Therapy in collaboration with Interdisciplinary Team  Note:   Patient can flex feet in bed. Attempted to mobilize patient POD0, but patient was unable to stand due to TTWB precautions and needing to support himself on one leg. Patient sat edge of bed for 10 minutes.

## 2019-09-13 NOTE — PROGRESS NOTES
Patient up in bed, no c/o pain or needs at this time. Report received from NOC RN and assumed care at 0715.

## 2019-09-14 LAB
ANION GAP SERPL CALC-SCNC: 10 MMOL/L (ref 0–11.9)
BASOPHILS # BLD AUTO: 0.2 % (ref 0–1.8)
BASOPHILS # BLD: 0.03 K/UL (ref 0–0.12)
BUN SERPL-MCNC: 22 MG/DL (ref 8–22)
CALCIUM SERPL-MCNC: 8.3 MG/DL (ref 8.5–10.5)
CHLORIDE SERPL-SCNC: 111 MMOL/L (ref 96–112)
CO2 SERPL-SCNC: 18 MMOL/L (ref 20–33)
CREAT SERPL-MCNC: 0.85 MG/DL (ref 0.5–1.4)
EOSINOPHIL # BLD AUTO: 0 K/UL (ref 0–0.51)
EOSINOPHIL NFR BLD: 0 % (ref 0–6.9)
ERYTHROCYTE [DISTWIDTH] IN BLOOD BY AUTOMATED COUNT: 60 FL (ref 35.9–50)
GLUCOSE SERPL-MCNC: 96 MG/DL (ref 65–99)
HCT VFR BLD AUTO: 30.9 % (ref 42–52)
HGB BLD-MCNC: 10 G/DL (ref 14–18)
IMM GRANULOCYTES # BLD AUTO: 0.09 K/UL (ref 0–0.11)
IMM GRANULOCYTES NFR BLD AUTO: 0.6 % (ref 0–0.9)
LYMPHOCYTES # BLD AUTO: 0.59 K/UL (ref 1–4.8)
LYMPHOCYTES NFR BLD: 4 % (ref 22–41)
MAGNESIUM SERPL-MCNC: 1.5 MG/DL (ref 1.5–2.5)
MCH RBC QN AUTO: 35 PG (ref 27–33)
MCHC RBC AUTO-ENTMCNC: 32.4 G/DL (ref 33.7–35.3)
MCV RBC AUTO: 108 FL (ref 81.4–97.8)
MONOCYTES # BLD AUTO: 0.75 K/UL (ref 0–0.85)
MONOCYTES NFR BLD AUTO: 5.1 % (ref 0–13.4)
NEUTROPHILS # BLD AUTO: 13.11 K/UL (ref 1.82–7.42)
NEUTROPHILS NFR BLD: 90.1 % (ref 44–72)
NRBC # BLD AUTO: 0 K/UL
NRBC BLD-RTO: 0 /100 WBC
PLATELET # BLD AUTO: 108 K/UL (ref 164–446)
PMV BLD AUTO: 11.9 FL (ref 9–12.9)
POTASSIUM SERPL-SCNC: 3.9 MMOL/L (ref 3.6–5.5)
RBC # BLD AUTO: 2.86 M/UL (ref 4.7–6.1)
SODIUM SERPL-SCNC: 139 MMOL/L (ref 135–145)
WBC # BLD AUTO: 14.6 K/UL (ref 4.8–10.8)

## 2019-09-14 PROCEDURE — 80048 BASIC METABOLIC PNL TOTAL CA: CPT

## 2019-09-14 PROCEDURE — 700111 HCHG RX REV CODE 636 W/ 250 OVERRIDE (IP): Performed by: ORTHOPAEDIC SURGERY

## 2019-09-14 PROCEDURE — 700111 HCHG RX REV CODE 636 W/ 250 OVERRIDE (IP): Performed by: INTERNAL MEDICINE

## 2019-09-14 PROCEDURE — 83735 ASSAY OF MAGNESIUM: CPT

## 2019-09-14 PROCEDURE — A9270 NON-COVERED ITEM OR SERVICE: HCPCS | Performed by: INTERNAL MEDICINE

## 2019-09-14 PROCEDURE — 85025 COMPLETE CBC W/AUTO DIFF WBC: CPT

## 2019-09-14 PROCEDURE — 770006 HCHG ROOM/CARE - MED/SURG/GYN SEMI*

## 2019-09-14 PROCEDURE — 36415 COLL VENOUS BLD VENIPUNCTURE: CPT

## 2019-09-14 PROCEDURE — 700102 HCHG RX REV CODE 250 W/ 637 OVERRIDE(OP): Performed by: INTERNAL MEDICINE

## 2019-09-14 PROCEDURE — 99231 SBSQ HOSP IP/OBS SF/LOW 25: CPT | Performed by: INTERNAL MEDICINE

## 2019-09-14 RX ADMIN — ALLOPURINOL 300 MG: 300 TABLET ORAL at 05:59

## 2019-09-14 RX ADMIN — HEPARIN SODIUM 5000 UNITS: 5000 INJECTION INTRAVENOUS; SUBCUTANEOUS at 13:35

## 2019-09-14 RX ADMIN — OMEPRAZOLE 40 MG: 20 CAPSULE, DELAYED RELEASE ORAL at 06:03

## 2019-09-14 RX ADMIN — SENNOSIDES, DOCUSATE SODIUM 2 TABLET: 50; 8.6 TABLET, FILM COATED ORAL at 17:01

## 2019-09-14 RX ADMIN — OMEPRAZOLE 40 MG: 20 CAPSULE, DELAYED RELEASE ORAL at 17:00

## 2019-09-14 RX ADMIN — Medication 100 MG: at 06:00

## 2019-09-14 RX ADMIN — GABAPENTIN 200 MG: 100 CAPSULE ORAL at 13:35

## 2019-09-14 RX ADMIN — GABAPENTIN 200 MG: 100 CAPSULE ORAL at 17:01

## 2019-09-14 RX ADMIN — OXYCODONE HYDROCHLORIDE 5 MG: 5 TABLET ORAL at 15:49

## 2019-09-14 RX ADMIN — HYDROMORPHONE HYDROCHLORIDE 0.5 MG: 1 INJECTION, SOLUTION INTRAMUSCULAR; INTRAVENOUS; SUBCUTANEOUS at 17:00

## 2019-09-14 RX ADMIN — GABAPENTIN 200 MG: 100 CAPSULE ORAL at 06:07

## 2019-09-14 RX ADMIN — SENNOSIDES, DOCUSATE SODIUM 2 TABLET: 50; 8.6 TABLET, FILM COATED ORAL at 05:59

## 2019-09-14 RX ADMIN — HEPARIN SODIUM 5000 UNITS: 5000 INJECTION INTRAVENOUS; SUBCUTANEOUS at 22:19

## 2019-09-14 RX ADMIN — OXYCODONE HYDROCHLORIDE 5 MG: 5 TABLET ORAL at 08:58

## 2019-09-14 RX ADMIN — CARVEDILOL 25 MG: 25 TABLET, FILM COATED ORAL at 17:00

## 2019-09-14 RX ADMIN — HEPARIN SODIUM 5000 UNITS: 5000 INJECTION INTRAVENOUS; SUBCUTANEOUS at 06:09

## 2019-09-14 RX ADMIN — CARVEDILOL 25 MG: 25 TABLET, FILM COATED ORAL at 06:08

## 2019-09-14 ASSESSMENT — ENCOUNTER SYMPTOMS
FEVER: 0
DIARRHEA: 0
PALPITATIONS: 0
FOCAL WEAKNESS: 0
ABDOMINAL PAIN: 0
CHILLS: 0
NAUSEA: 0
CONSTIPATION: 0
SHORTNESS OF BREATH: 0
SENSORY CHANGE: 1
VOMITING: 0

## 2019-09-14 NOTE — CARE PLAN
Problem: Safety  Goal: Will remain free from falls  Outcome: PROGRESSING AS EXPECTED  Note:   Pt. Calls appropriately, bedside table within reach, call light within reach, bed locked and in lowest position, upper side rails up, educated about using assistive device to mobilize     Problem: Skin Integrity  Goal: Risk for impaired skin integrity will decrease  Outcome: PROGRESSING AS EXPECTED  Note:   Pt. Using moisturizer, doing Q2 turns, checking on skin daily

## 2019-09-14 NOTE — CARE PLAN
Problem: Safety  Goal: Will remain free from falls  Outcome: PROGRESSING AS EXPECTED  Pt reinforced education on risk for falls, pt verbalized understanding. Bed low, locked, non-skid socks on, call light and water within reach.      Problem: Pain Management  Goal: Pain level will decrease to patient's comfort goal  Outcome: PROGRESSING AS EXPECTED   Pt reinforced education on pain scale 0-10, pt verbalized understanding. Denies pain at this time, tolerable, no further needs.

## 2019-09-15 LAB — NUCLEAR IGG SER QL IA: NORMAL

## 2019-09-15 PROCEDURE — 700111 HCHG RX REV CODE 636 W/ 250 OVERRIDE (IP): Performed by: ORTHOPAEDIC SURGERY

## 2019-09-15 PROCEDURE — A9270 NON-COVERED ITEM OR SERVICE: HCPCS | Performed by: INTERNAL MEDICINE

## 2019-09-15 PROCEDURE — 99231 SBSQ HOSP IP/OBS SF/LOW 25: CPT | Performed by: INTERNAL MEDICINE

## 2019-09-15 PROCEDURE — 97530 THERAPEUTIC ACTIVITIES: CPT

## 2019-09-15 PROCEDURE — 700102 HCHG RX REV CODE 250 W/ 637 OVERRIDE(OP): Performed by: INTERNAL MEDICINE

## 2019-09-15 PROCEDURE — 770006 HCHG ROOM/CARE - MED/SURG/GYN SEMI*

## 2019-09-15 RX ADMIN — HEPARIN SODIUM 5000 UNITS: 5000 INJECTION INTRAVENOUS; SUBCUTANEOUS at 14:23

## 2019-09-15 RX ADMIN — ALLOPURINOL 300 MG: 300 TABLET ORAL at 06:07

## 2019-09-15 RX ADMIN — OXYCODONE HYDROCHLORIDE 5 MG: 5 TABLET ORAL at 21:31

## 2019-09-15 RX ADMIN — HEPARIN SODIUM 5000 UNITS: 5000 INJECTION INTRAVENOUS; SUBCUTANEOUS at 06:07

## 2019-09-15 RX ADMIN — GABAPENTIN 200 MG: 100 CAPSULE ORAL at 11:20

## 2019-09-15 RX ADMIN — Medication 100 MG: at 06:00

## 2019-09-15 RX ADMIN — HEPARIN SODIUM 5000 UNITS: 5000 INJECTION INTRAVENOUS; SUBCUTANEOUS at 21:31

## 2019-09-15 RX ADMIN — POLYETHYLENE GLYCOL 3350 1 PACKET: 17 POWDER, FOR SOLUTION ORAL at 17:49

## 2019-09-15 RX ADMIN — CARVEDILOL 25 MG: 25 TABLET, FILM COATED ORAL at 17:50

## 2019-09-15 RX ADMIN — CARVEDILOL 25 MG: 25 TABLET, FILM COATED ORAL at 06:07

## 2019-09-15 RX ADMIN — OXYCODONE HYDROCHLORIDE 10 MG: 10 TABLET ORAL at 11:20

## 2019-09-15 RX ADMIN — GABAPENTIN 200 MG: 100 CAPSULE ORAL at 06:07

## 2019-09-15 RX ADMIN — GABAPENTIN 200 MG: 100 CAPSULE ORAL at 17:49

## 2019-09-15 RX ADMIN — OMEPRAZOLE 40 MG: 20 CAPSULE, DELAYED RELEASE ORAL at 17:50

## 2019-09-15 RX ADMIN — OXYCODONE HYDROCHLORIDE 10 MG: 10 TABLET ORAL at 06:21

## 2019-09-15 RX ADMIN — OXYCODONE HYDROCHLORIDE 10 MG: 10 TABLET ORAL at 17:53

## 2019-09-15 ASSESSMENT — ENCOUNTER SYMPTOMS
SHORTNESS OF BREATH: 0
PALPITATIONS: 0
NAUSEA: 0
FEVER: 0
CHILLS: 0
VOMITING: 0
FOCAL WEAKNESS: 0
CONSTIPATION: 0
ABDOMINAL PAIN: 0
DIARRHEA: 0

## 2019-09-15 ASSESSMENT — COGNITIVE AND FUNCTIONAL STATUS - GENERAL
SUGGESTED CMS G CODE MODIFIER MOBILITY: CM
MOVING FROM LYING ON BACK TO SITTING ON SIDE OF FLAT BED: UNABLE
STANDING UP FROM CHAIR USING ARMS: A LOT
MOVING TO AND FROM BED TO CHAIR: UNABLE
WALKING IN HOSPITAL ROOM: TOTAL
TURNING FROM BACK TO SIDE WHILE IN FLAT BAD: A LOT
CLIMB 3 TO 5 STEPS WITH RAILING: TOTAL
MOBILITY SCORE: 8

## 2019-09-15 ASSESSMENT — GAIT ASSESSMENTS: GAIT LEVEL OF ASSIST: UNABLE TO PARTICIPATE

## 2019-09-15 NOTE — CARE PLAN
Problem: Skin Integrity  Goal: Risk for impaired skin integrity will decrease  Outcome: PROGRESSING AS EXPECTED  Note:   Pt. Skin being assessed daily, using moisturizer, being repositioned Q 2 hours     Problem: Mobility  Goal: Risk for activity intolerance will decrease  Outcome: PROGRESSING SLOWER THAN EXPECTED  Note:   Pt. Is working with PT/OT, pt. Is being helped repositioned Q2 hours, doing ROM in bed

## 2019-09-15 NOTE — DISCHARGE PLANNING
Care Transition Team Assessment    Information Source  Orientation : Oriented x 4  Information Given By: Patient  Who is responsible for making decisions for patient? : Patient    Readmission Evaluation  Is this a readmission?: No    Elopement Risk  Legal Hold: No  Ambulatory or Self Mobile in Wheelchair: No-Not an Elopement Risk  Elopement Risk: Not at Risk for Elopement    Interdisciplinary Discharge Planning  Lives with - Patient's Self Care Capacity: Spouse  Patient or legal guardian wants to designate a caregiver (see row info): No  Housing / Facility: [address] [facility]  Prior Services: None    Discharge Preparedness  What is your plan after discharge?: Skilled nursing facility  What are your discharge supports?: Spouse  Prior Functional Level: Independent with Activities of Daily Living, Independent with Medication Management, Uses Walker  Difficulity with ADLs: Walking  Difficulity with IADLs: None    Functional Assesment  Prior Functional Level: Independent with Activities of Daily Living, Independent with Medication Management, Uses Walker         Vision / Hearing Impairment  Vision Impairment : No  Hearing Impairment : No              Domestic Abuse  Have you ever been the victim of abuse or violence?: No  Physical Abuse or Sexual Abuse: No  Verbal Abuse or Emotional Abuse: No  Possible Abuse Reported to:: Not Applicable    Psychological Assessment  History of Substance Abuse: None  History of Psychiatric Problems: No  Non-compliant with Treatment: No  Newly Diagnosed Illness: No         Anticipated Discharge Information  Anticipated discharge disposition: SNF

## 2019-09-15 NOTE — THERAPY
"Physical Therapy Treatment completed.   Bed Mobility:  Supine to Sit: Moderate Assist  Transfers: Sit to Stand: Moderate Assist  Gait: Level Of Assist: Unable to Participate   Plan of Care: Will benefit from Physical Therapy 4 times per week  Discharge Recommendations: Equipment: Will Continue to Assess for Equipment Needs. Recommend post-acute placement for continued physical therapy services prior to discharge home. Patient can tolerate post-acute therapies at a 5x/week frequency.       See \"Rehab Therapy-Acute\" Patient Summary Report for complete documentation.     Pt seen for PT treatment session. Pt motivated to participate but limited by pain. Instructed pt in RLE TTWB and demonstrated with FWW. Pt performed STS with mod A to FWW, poor adherence to RLE TTWB and difficulty weight shifting once standing. Pt resisting and with heavy R lateral lean, required max A to shift weight and to perform SPT to bedside chair. Pt reports he is unable to tell that he is leaning to the R. Session limited by pain and fatigue. Will continue to work with pt in acute setting to progress mobility. Continue to recommend postacute placement prior to return home.   "

## 2019-09-15 NOTE — PROGRESS NOTES
Pt. Offered to give miralax to have a BM. Pt. Refused due to wanting to mobilize better before getting miralax. Pt. Educated about stool softners

## 2019-09-15 NOTE — CARE PLAN
Problem: Bowel/Gastric:  Goal: Normal bowel function is maintained or improved  Outcome: PROGRESSING AS EXPECTED  Pt educated on importance of regular bowel movements and mobility, pt verbalized understanding. Bowel sounds normoactive x4, unable to ambulate due to pain, refused, unstable condition. Will encourage oral intake and stool softeners.     Problem: Pain Management  Goal: Pain level will decrease to patient's comfort goal  Outcome: PROGRESSING AS EXPECTED   Pt reinforced education on pain scale 0-10, pt verbalized understanding. Pain 3/10 only if moving, tolerable, denies further needs.

## 2019-09-16 PROCEDURE — 700102 HCHG RX REV CODE 250 W/ 637 OVERRIDE(OP): Performed by: INTERNAL MEDICINE

## 2019-09-16 PROCEDURE — A9270 NON-COVERED ITEM OR SERVICE: HCPCS | Performed by: INTERNAL MEDICINE

## 2019-09-16 PROCEDURE — 99231 SBSQ HOSP IP/OBS SF/LOW 25: CPT | Performed by: INTERNAL MEDICINE

## 2019-09-16 PROCEDURE — 700111 HCHG RX REV CODE 636 W/ 250 OVERRIDE (IP): Performed by: ORTHOPAEDIC SURGERY

## 2019-09-16 PROCEDURE — 97535 SELF CARE MNGMENT TRAINING: CPT

## 2019-09-16 PROCEDURE — 770006 HCHG ROOM/CARE - MED/SURG/GYN SEMI*

## 2019-09-16 RX ADMIN — GABAPENTIN 200 MG: 100 CAPSULE ORAL at 04:46

## 2019-09-16 RX ADMIN — OMEPRAZOLE 40 MG: 20 CAPSULE, DELAYED RELEASE ORAL at 16:41

## 2019-09-16 RX ADMIN — HEPARIN SODIUM 5000 UNITS: 5000 INJECTION INTRAVENOUS; SUBCUTANEOUS at 23:01

## 2019-09-16 RX ADMIN — OXYCODONE HYDROCHLORIDE 5 MG: 5 TABLET ORAL at 05:00

## 2019-09-16 RX ADMIN — CARVEDILOL 25 MG: 25 TABLET, FILM COATED ORAL at 04:46

## 2019-09-16 RX ADMIN — CARVEDILOL 25 MG: 25 TABLET, FILM COATED ORAL at 16:41

## 2019-09-16 RX ADMIN — SENNOSIDES, DOCUSATE SODIUM 2 TABLET: 50; 8.6 TABLET, FILM COATED ORAL at 06:00

## 2019-09-16 RX ADMIN — GABAPENTIN 200 MG: 100 CAPSULE ORAL at 12:05

## 2019-09-16 RX ADMIN — OXYCODONE HYDROCHLORIDE 5 MG: 5 TABLET ORAL at 23:04

## 2019-09-16 RX ADMIN — HEPARIN SODIUM 5000 UNITS: 5000 INJECTION INTRAVENOUS; SUBCUTANEOUS at 13:27

## 2019-09-16 RX ADMIN — OXYCODONE HYDROCHLORIDE 10 MG: 10 TABLET ORAL at 16:40

## 2019-09-16 RX ADMIN — Medication 100 MG: at 04:46

## 2019-09-16 RX ADMIN — ALLOPURINOL 300 MG: 300 TABLET ORAL at 04:46

## 2019-09-16 RX ADMIN — OXYCODONE HYDROCHLORIDE 10 MG: 10 TABLET ORAL at 12:05

## 2019-09-16 RX ADMIN — SENNOSIDES, DOCUSATE SODIUM 2 TABLET: 50; 8.6 TABLET, FILM COATED ORAL at 16:41

## 2019-09-16 RX ADMIN — OMEPRAZOLE 40 MG: 20 CAPSULE, DELAYED RELEASE ORAL at 04:46

## 2019-09-16 RX ADMIN — HEPARIN SODIUM 5000 UNITS: 5000 INJECTION INTRAVENOUS; SUBCUTANEOUS at 04:46

## 2019-09-16 RX ADMIN — GABAPENTIN 200 MG: 100 CAPSULE ORAL at 16:41

## 2019-09-16 RX ADMIN — MAGNESIUM HYDROXIDE 30 ML: 400 SUSPENSION ORAL at 06:55

## 2019-09-16 ASSESSMENT — ENCOUNTER SYMPTOMS
CONSTIPATION: 0
DIARRHEA: 0
VOMITING: 0
CHILLS: 0
NAUSEA: 0
FOCAL WEAKNESS: 0
PALPITATIONS: 0
SHORTNESS OF BREATH: 0
ABDOMINAL PAIN: 0
FEVER: 0

## 2019-09-16 ASSESSMENT — COGNITIVE AND FUNCTIONAL STATUS - GENERAL
DAILY ACTIVITIY SCORE: 15
DRESSING REGULAR UPPER BODY CLOTHING: A LOT
TOILETING: A LOT
DRESSING REGULAR LOWER BODY CLOTHING: A LOT
SUGGESTED CMS G CODE MODIFIER DAILY ACTIVITY: CK
PERSONAL GROOMING: A LITTLE
HELP NEEDED FOR BATHING: A LOT

## 2019-09-16 NOTE — THERAPY
"Occupational Therapy Treatment completed with focus on ADLs, ADL transfers and patient education.  Functional Status:  Pt seen for OT tx. Max A supine>EOB. Pt was pleasant, cooperative and eager for tx. Pt attempted sit>stand but d/t pain, generalized weakness and fatigue pt was unable to complete full stand. Pt performed supv grooming ADL at EOB. Psychosocial intervention to address current limitations and POC. Pt is motivated to regain strength, endurance, and independence in ADLs and ADL txfs.    Plan of Care: Will benefit from Occupational Therapy 4 times per week  Discharge Recommendations:  Equipment Will Continue to Assess for Equipment Needs. Post-acute therapy Discharge to a transitional care facility for continued skilled therapy services.    See \"Rehab Therapy-Acute\" Patient Summary Report for complete documentation.     <10  "

## 2019-09-16 NOTE — DISCHARGE PLANNING
Anticipated Discharge Disposition: SNF    Action: LSW met with the Pt at bedside to receive SNF choice. Pt wanted more information regarding the SNF before making a choice. LSW provided the Pt with brochures to each SNF and information regarding their Medicare star ratings.     Barriers to Discharge: SNf choice and acceptance    Plan: LSW to follow up with Pt on SNF choice

## 2019-09-16 NOTE — PROGRESS NOTES
HOSPITAL MEDICINE PROGRESS NOTE    Date of service  9/15/2019    Chief Complaint  GLF and Weakness (chornic R sided weakness)      Hospital Course:    70 yo man with h/o peripheral neuropathy, p/w after a GLF with a left femur fracture that was surgically repaired with ORIF by Dr. Barros on September 12, 2019.      Interval History Updates:  Hospital Day #Hospital Day: 5   No event overnight.  Afebrile.    Awaiting placement.    Consultants/Specialty  Orthopaedics    Review of Systems   Review of Systems   Constitutional: Negative for chills and fever.   Respiratory: Negative for shortness of breath.    Cardiovascular: Negative for chest pain, palpitations and leg swelling.   Gastrointestinal: Negative for abdominal pain, constipation, diarrhea, nausea and vomiting.   Musculoskeletal: Positive for joint pain.   Skin: Negative for rash.   Neurological: Negative for focal weakness.   Endo/Heme/Allergies: Negative.    All other systems reviewed and are negative.       Medications:  • heparin  5,000 Units Q8HRS   • senna-docusate  2 Tab BID    And   • polyethylene glycol/lytes  1 Packet QDAY PRN    And   • magnesium hydroxide  30 mL QDAY PRN    And   • bisacodyl  10 mg QDAY PRN   • NS   Continuous   • acetaminophen  650 mg Q6HRS PRN   • enalaprilat  1.25 mg Q6HRS PRN   • ondansetron  4 mg Q4HRS PRN   • Pharmacy Consult Request  1 Each PHARMACY TO DOSE    And   • oxyCODONE immediate-release  5 mg Q3HRS PRN    And   • oxyCODONE immediate-release  10 mg Q3HRS PRN    And   • HYDROmorphone  0.5 mg Q3HRS PRN   • allopurinol  300 mg DAILY   • carvedilol  25 mg BID   • cloNIDine  1 Patch Q FRIDAY   • gabapentin  200 mg TID   • omeprazole  40 mg BID   • thiamine  100 mg DAILY       Physical Exam   Vitals:    09/14/19 1700 09/14/19 2100 09/15/19 0500 09/15/19 0900   BP: 151/91 134/81 138/86 114/66   Pulse: 90 86 85 81   Resp: 17 15 15 15   Temp: 37 °C (98.6 °F) 37.2 °C (99 °F) 36.8 °C (98.3 °F) 36.7 °C (98 °F)   TempSrc:  Temporal Temporal Temporal Temporal   SpO2: 98% 97% 95% 95%   Weight:       Height:           Physical Exam   Constitutional: He is oriented to person, place, and time and well-developed, well-nourished, and in no distress. No distress.   HENT:   Head: Normocephalic and atraumatic.   Eyes: Pupils are equal, round, and reactive to light. Conjunctivae are normal. No scleral icterus.   Neck: Normal range of motion. Neck supple. No JVD present.   Cardiovascular: Normal rate, regular rhythm and normal heart sounds. Exam reveals no gallop and no friction rub.   No murmur heard.  Pulmonary/Chest: Effort normal and breath sounds normal. No respiratory distress. He has no wheezes. He has no rales. He exhibits no tenderness.   Abdominal: Soft. Bowel sounds are normal. There is no tenderness. There is no rebound.   Musculoskeletal: Normal range of motion. He exhibits tenderness. He exhibits no edema or deformity.   Right hip tender.  Dressing C/D/I.     Neurological: He is alert and oriented to person, place, and time.   Skin: Skin is warm and dry. No rash noted. He is not diaphoretic. No erythema.   Psychiatric: Mood and affect normal.   Nursing note and vitals reviewed.       Recent Labs     09/13/19 0614 09/14/19 0620   WBC 14.1* 14.6*   RBC 2.94* 2.86*   HEMOGLOBIN 10.3* 10.0*   HEMATOCRIT 31.4* 30.9*   .8* 108.0*   MCH 35.0* 35.0*   MCHC 32.8* 32.4*   RDW 58.2* 60.0*   PLATELETCT 127* 108*   MPV 11.9 11.9      Laboratory   Recent Labs     09/13/19 0614 09/14/19 0620   WBC 14.1* 14.6*   RBC 2.94* 2.86*   HEMOGLOBIN 10.3* 10.0*   HEMATOCRIT 31.4* 30.9*   PLATELETCT 127* 108*     Recent Labs     09/13/19 0614 09/14/19 0620   SODIUM 141 139   POTASSIUM 4.1 3.9   CHLORIDE 112 111   CO2 18* 18*   GLUCOSE 180* 96   BUN 21 22   CREATININE 0.92 0.85      Recent Labs     09/13/19 0614 09/14/19 0620   GLUCOSE 180* 96           Lactic acid 3.0 -> 1.0.    Folic acid 7  Vit B12 591  DAMIEN negative     No results found for:  BLOODCULTU, BLDCULT, BCHOLD      Labs reviewed by me and noted above.    Radiology  DX-PORTABLE FLUORO > 1 HOUR  Narrative: 9/12/2019 7:30 AM    HISTORY/REASON FOR EXAM:  Right hip ORIF.    TECHNIQUE/EXAM DESCRIPTION AND NUMBER OF VIEWS:  Portable fluoroscopy for greater than one hour in OR.    FINDINGS:  The portable fluoroscopy unit was obligated to the procedure for greater than one hour. Actual fluoro time was 48 seconds.  Impression: Portable fluoroscopy utilized for 48 seconds.    INTERPRETING LOCATION: [address] [place] [address], [id number]  DX-HIP-UNILATERAL-W/O PELVIS-2/3 VIEWS RIGHT  Narrative: 9/12/2019 7:30 AM    HISTORY/REASON FOR EXAM:  Right hip ORIF    TECHNIQUE/EXAM DESCRIPTION AND NUMBER OF VIEWS:  3 views of the RIGHT hip. Digitized Intraoperative Radiograph    FINDINGS: Fixation hardware projecting over the femur    Fluoroscopic time:48 seconds, total dose 5.17 mGy  Impression: Digitized intraoperative radiograph is submitted for review.  This examination is not for diagnostic purpose but for guidance during a surgical procedure.    MRI L-spine 6/28/19:  1.  L4-5 mild spinal canal narrowing without cornelio spinal stenosis  2.  Foraminal stenoses at L3-4, L4-5, and L5-S1  3.  Multilevel facet arthropathy, disc bulge, and/or disc protrusion     Results for orders placed or performed during the hospital encounter of 04/27/16   ECHOCARDIOGRAM COMP W/O CONT   Result Value Ref Range    Eject.Frac. MOD BP 57.42     Eject.Frac. MOD 4C 60.63     Eject.Frac. MOD 2C 56.76     Left Ventrical Ejection Fraction 60           Assessment and Plan    Principal Problem:    Closed fracture of neck of right femur (HCC) POA: Yes.  S/p ORIF 9/12.  Ortho following.  PT and OT to eval.  Pain controlled.        Fall from ground level POA: Yes.  Likely due to peripheral neuropathy that is chronic.  PT OT eval.        Macrocytosis (new)  POA: Yes.  Serum Vit B12 and folic acid are normal.      Lactic acidosis POA: Yes.  Like from the  fall.  Resolved with IVF.  Lactic now 1.0.  Cont IVF and monitor.  No sign of active infection.      Peripheral neuropathy POA: Yes.  Followed by Dr. Zaheer Elliott outpatient.  MRI L-spine in 06/19 as reviewed above; no sign of significant stenosis.  Pt should continue to f/u with Dr. Elliott after discharge for this acute episode.  DAMIEN negative.  ESR normal.  He admits to past history of heavy alcohol use for years.  This likely a significant contributing factor.      Hypertension POA: Yes.  Cont clonidine patch qWeekly.      H/o esophageal stricture:  Tolerating foods OK without chest pain.  Pills need to be crushed.  Monitor for achalasia Sx.  If needed, will consult GI for EGd/dilation.    DVT prophylaxis: SC heparin. and SCD.      CODE STATUS:  FULL CODE    Disposition: Medically stable for discharge to SNF whenever available.      Case was discussed with Primary RN and Charge Nurse, Medical SW, , and Pharmacist on IDTround in addition to individual(s) mentioned above.    I have performed a physical exam and reviewed and updated ROS as of today, 9/15/2019.  In review of yesterday's note dated, 9/14/2019, there are no changes except as documented above.      Merari Flores M.D.

## 2019-09-17 PROCEDURE — 700111 HCHG RX REV CODE 636 W/ 250 OVERRIDE (IP): Performed by: ORTHOPAEDIC SURGERY

## 2019-09-17 PROCEDURE — 700102 HCHG RX REV CODE 250 W/ 637 OVERRIDE(OP): Performed by: INTERNAL MEDICINE

## 2019-09-17 PROCEDURE — 770006 HCHG ROOM/CARE - MED/SURG/GYN SEMI*

## 2019-09-17 PROCEDURE — A9270 NON-COVERED ITEM OR SERVICE: HCPCS | Performed by: INTERNAL MEDICINE

## 2019-09-17 PROCEDURE — 99232 SBSQ HOSP IP/OBS MODERATE 35: CPT | Performed by: HOSPITALIST

## 2019-09-17 RX ADMIN — OXYCODONE HYDROCHLORIDE 10 MG: 10 TABLET ORAL at 17:49

## 2019-09-17 RX ADMIN — CARVEDILOL 25 MG: 25 TABLET, FILM COATED ORAL at 17:49

## 2019-09-17 RX ADMIN — SENNOSIDES, DOCUSATE SODIUM 2 TABLET: 50; 8.6 TABLET, FILM COATED ORAL at 06:24

## 2019-09-17 RX ADMIN — MAGNESIUM HYDROXIDE 30 ML: 400 SUSPENSION ORAL at 06:24

## 2019-09-17 RX ADMIN — Medication 100 MG: at 06:24

## 2019-09-17 RX ADMIN — OXYCODONE HYDROCHLORIDE 10 MG: 10 TABLET ORAL at 12:45

## 2019-09-17 RX ADMIN — HEPARIN SODIUM 5000 UNITS: 5000 INJECTION INTRAVENOUS; SUBCUTANEOUS at 06:25

## 2019-09-17 RX ADMIN — SENNOSIDES, DOCUSATE SODIUM 2 TABLET: 50; 8.6 TABLET, FILM COATED ORAL at 17:50

## 2019-09-17 RX ADMIN — OMEPRAZOLE 40 MG: 20 CAPSULE, DELAYED RELEASE ORAL at 06:23

## 2019-09-17 RX ADMIN — GABAPENTIN 200 MG: 100 CAPSULE ORAL at 12:45

## 2019-09-17 RX ADMIN — HEPARIN SODIUM 5000 UNITS: 5000 INJECTION INTRAVENOUS; SUBCUTANEOUS at 21:35

## 2019-09-17 RX ADMIN — OXYCODONE HYDROCHLORIDE 10 MG: 10 TABLET ORAL at 03:20

## 2019-09-17 RX ADMIN — OXYCODONE HYDROCHLORIDE 10 MG: 10 TABLET ORAL at 08:47

## 2019-09-17 RX ADMIN — HEPARIN SODIUM 5000 UNITS: 5000 INJECTION INTRAVENOUS; SUBCUTANEOUS at 12:46

## 2019-09-17 RX ADMIN — OXYCODONE HYDROCHLORIDE 5 MG: 5 TABLET ORAL at 21:35

## 2019-09-17 RX ADMIN — GABAPENTIN 200 MG: 100 CAPSULE ORAL at 06:23

## 2019-09-17 RX ADMIN — CARVEDILOL 25 MG: 25 TABLET, FILM COATED ORAL at 06:24

## 2019-09-17 RX ADMIN — OMEPRAZOLE 40 MG: 20 CAPSULE, DELAYED RELEASE ORAL at 17:49

## 2019-09-17 RX ADMIN — GABAPENTIN 200 MG: 100 CAPSULE ORAL at 17:49

## 2019-09-17 RX ADMIN — ALLOPURINOL 300 MG: 300 TABLET ORAL at 06:23

## 2019-09-17 ASSESSMENT — PATIENT HEALTH QUESTIONNAIRE - PHQ9
1. LITTLE INTEREST OR PLEASURE IN DOING THINGS: NOT AT ALL
SUM OF ALL RESPONSES TO PHQ9 QUESTIONS 1 AND 2: 0

## 2019-09-17 ASSESSMENT — ENCOUNTER SYMPTOMS
HEADACHES: 0
DEPRESSION: 0
MYALGIAS: 0
DIZZINESS: 0
NECK PAIN: 0
PALPITATIONS: 0
FEVER: 0
DOUBLE VISION: 0
COUGH: 0
BLURRED VISION: 0
NAUSEA: 0
HEARTBURN: 0
BRUISES/BLEEDS EASILY: 0
CHILLS: 0
SINUS PAIN: 0

## 2019-09-17 NOTE — DISCHARGE PLANNING
Anticipated Discharge Disposition: SNF    Action: LSW met with the Pt to follow up if he was able to make a SNF choice decision. Pt stated that his wife has all of the paperwork and is still looking into SNF but would like this LSW to follow up with him tonight or tomorrow as he will be telling his wife that they need to make SNF choice so that this LSW can send out referrals.    Barriers to Discharge: SNF choice and acceptance    Plan: follow up with Pt for SNF choice

## 2019-09-17 NOTE — CARE PLAN
Problem: Knowledge Deficit  Goal: Knowledge of disease process/condition, treatment plan, diagnostic tests, and medications will improve  Outcome: PROGRESSING AS EXPECTED   The pt is educated on her plan of care, treatments, an medications at this time.     Problem: Respiratory:  Goal: Respiratory status will improve  Outcome: PROGRESSING AS EXPECTED   The pt is able to maintain his oxygen status at a normal range without supplemental oxygen at this time.

## 2019-09-17 NOTE — PROGRESS NOTES
HOSPITAL MEDICINE PROGRESS NOTE    Date of service  9/16/2019    Chief Complaint  GLF and Weakness (chornic R sided weakness)      Hospital Course:    68 yo man with h/o peripheral neuropathy, p/w after a GLF with a left femur fracture that was surgically repaired with ORIF by Dr. Barros on September 12, 2019.      Interval History Updates:  Hospital Day #Hospital Day: 6   No event overnight.  Afebrile.    Awaiting placement.    Consultants/Specialty  Orthopaedics    Review of Systems   Review of Systems   Constitutional: Negative for chills and fever.   Respiratory: Negative for shortness of breath.    Cardiovascular: Negative for chest pain, palpitations and leg swelling.   Gastrointestinal: Negative for abdominal pain, constipation, diarrhea, nausea and vomiting.   Musculoskeletal: Positive for joint pain.   Skin: Negative for rash.   Neurological: Negative for focal weakness.   Endo/Heme/Allergies: Negative.    All other systems reviewed and are negative.       Medications:  • heparin  5,000 Units Q8HRS   • senna-docusate  2 Tab BID    And   • polyethylene glycol/lytes  1 Packet QDAY PRN    And   • magnesium hydroxide  30 mL QDAY PRN    And   • bisacodyl  10 mg QDAY PRN   • NS   Continuous   • acetaminophen  650 mg Q6HRS PRN   • enalaprilat  1.25 mg Q6HRS PRN   • ondansetron  4 mg Q4HRS PRN   • Pharmacy Consult Request  1 Each PHARMACY TO DOSE    And   • oxyCODONE immediate-release  5 mg Q3HRS PRN    And   • oxyCODONE immediate-release  10 mg Q3HRS PRN    And   • HYDROmorphone  0.5 mg Q3HRS PRN   • allopurinol  300 mg DAILY   • carvedilol  25 mg BID   • cloNIDine  1 Patch Q FRIDAY   • gabapentin  200 mg TID   • omeprazole  40 mg BID   • thiamine  100 mg DAILY       Physical Exam   Vitals:    09/15/19 2037 09/16/19 0449 09/16/19 0800 09/16/19 1700   BP: 127/78 132/55 134/71 127/80   Pulse: 70 86 93 71   Resp: 16 18 17 16   Temp: 36.9 °C (98.4 °F) 37.1 °C (98.8 °F) 36.7 °C (98 °F) 36.4 °C (97.6 °F)   TempSrc:  Temporal Temporal Temporal Temporal   SpO2: 96% 92% 93% 93%   Weight:       Height:           Physical Exam   Constitutional: He is oriented to person, place, and time and well-developed, well-nourished, and in no distress. No distress.   HENT:   Head: Normocephalic and atraumatic.   Eyes: Pupils are equal, round, and reactive to light. Conjunctivae are normal. No scleral icterus.   Neck: Normal range of motion. Neck supple. No JVD present.   Cardiovascular: Normal rate, regular rhythm and normal heart sounds. Exam reveals no gallop and no friction rub.   No murmur heard.  Pulmonary/Chest: Effort normal and breath sounds normal. No respiratory distress. He has no wheezes. He has no rales. He exhibits no tenderness.   Abdominal: Soft. Bowel sounds are normal. There is no tenderness. There is no rebound.   Musculoskeletal: Normal range of motion. He exhibits tenderness. He exhibits no edema or deformity.   Right hip tender.  Dressing C/D/I.     Neurological: He is alert and oriented to person, place, and time.   Skin: Skin is warm and dry. No rash noted. He is not diaphoretic. No erythema.   Psychiatric: Mood and affect normal.   Nursing note and vitals reviewed.       Recent Labs     09/14/19  0620   WBC 14.6*   RBC 2.86*   HEMOGLOBIN 10.0*   HEMATOCRIT 30.9*   .0*   MCH 35.0*   MCHC 32.4*   RDW 60.0*   PLATELETCT 108*   MPV 11.9      Laboratory   Recent Labs     09/14/19  0620   WBC 14.6*   RBC 2.86*   HEMOGLOBIN 10.0*   HEMATOCRIT 30.9*   PLATELETCT 108*     Recent Labs     09/14/19  0620   SODIUM 139   POTASSIUM 3.9   CHLORIDE 111   CO2 18*   GLUCOSE 96   BUN 22   CREATININE 0.85      Recent Labs     09/14/19  0620   GLUCOSE 96           Lactic acid 3.0 -> 1.0.    Folic acid 7  Vit B12 591  DAMIEN negative     No results found for: BLOODCULTU, BLDCULT, BCHOLD      Labs reviewed by me and noted above.    Radiology  DX-PORTABLE FLUORO > 1 HOUR  Narrative: 9/12/2019 7:30 AM    HISTORY/REASON FOR EXAM:  Right hip  ORIF.    TECHNIQUE/EXAM DESCRIPTION AND NUMBER OF VIEWS:  Portable fluoroscopy for greater than one hour in OR.    FINDINGS:  The portable fluoroscopy unit was obligated to the procedure for greater than one hour. Actual fluoro time was 48 seconds.  Impression: Portable fluoroscopy utilized for 48 seconds.    INTERPRETING LOCATION: [address] [place], [place] [address], [id number]  DX-HIP-UNILATERAL-W/O PELVIS-2/3 VIEWS RIGHT  Narrative: 9/12/2019 7:30 AM    HISTORY/REASON FOR EXAM:  Right hip ORIF    TECHNIQUE/EXAM DESCRIPTION AND NUMBER OF VIEWS:  3 views of the RIGHT hip. Digitized Intraoperative Radiograph    FINDINGS: Fixation hardware projecting over the femur    Fluoroscopic time:48 seconds, total dose 5.17 mGy  Impression: Digitized intraoperative radiograph is submitted for review.  This examination is not for diagnostic purpose but for guidance during a surgical procedure.    MRI L-spine 6/28/19:  1.  L4-5 mild spinal canal narrowing without cornelio spinal stenosis  2.  Foraminal stenoses at L3-4, L4-5, and L5-S1  3.  Multilevel facet arthropathy, disc bulge, and/or disc protrusion     Results for orders placed or performed during the hospital encounter of 04/27/16   ECHOCARDIOGRAM COMP W/O CONT   Result Value Ref Range    Eject.Frac. MOD BP 57.42     Eject.Frac. MOD 4C 60.63     Eject.Frac. MOD 2C 56.76     Left Ventrical Ejection Fraction 60           Assessment and Plan    Principal Problem:    Closed fracture of neck of right femur (HCC) POA: Yes.  S/p ORIF 9/12.  Ortho following.  PT and OT to eval.  Pain controlled.        Fall from ground level POA: Yes.  Likely due to peripheral neuropathy that is chronic.  PT OT eval.        Macrocytosis (new)  POA: Yes.  Serum Vit B12 and folic acid are normal.      Lactic acidosis POA: Yes.  Like from the fall.  Resolved with IVF.  Lactic now 1.0.  Cont IVF and monitor.  No sign of active infection.      Peripheral neuropathy POA: Yes.  Followed by Dr. Zaheer Elliott outpatient.  MRI  L-spine in 06/19 as reviewed above; no sign of significant stenosis.  Pt should continue to f/u with Dr. Elliott after discharge for this acute episode.  DAMIEN negative.  ESR normal.  He admits to past history of heavy alcohol use for years.  This likely a significant contributing factor.      Hypertension POA: Yes.  Cont clonidine patch qWeekly.      H/o esophageal stricture:  Tolerating foods OK without chest pain.  Pills need to be crushed.  Monitor for achalasia Sx.  If needed, will consult GI for EGd/dilation.    DVT prophylaxis: SC heparin. and SCD.      CODE STATUS:  FULL CODE    Disposition: Medically stable for discharge to SNF whenever available.      Case was discussed with Primary RN and Charge Nurse, Medical SW, , and Pharmacist on IDTround in addition to individual(s) mentioned above.    I have performed a physical exam and reviewed and updated ROS as of today, 9/16/2019.  In review of yesterday's note dated, 9/15/2019, there are no changes except as documented above.      Merari Flores M.D.

## 2019-09-17 NOTE — PROGRESS NOTES
[facility] Medicine Daily Progress Note    Date of Service  9/17/2019    Chief Complaint  69 y.o. male admitted 9/11/2019 with ground-level fall with weakness right-sided      Interval Problem Update  Patient is resting in bed, no new complaints, no fever no chills no nausea no vomiting tolerating diet, discussed with patient regarding skilled nursing placement he is stated he is going to discuss with  and with his wife, pain needs well controlled.    Consultants/Specialty  Orthopedic surgery    Code Status  Full code    Disposition  Skilled nursing    Review of Systems  Review of Systems   Constitutional: Negative for chills and fever.   HENT: Negative for congestion and sinus pain.    Eyes: Negative for blurred vision and double vision.   Respiratory: Negative for cough.    Cardiovascular: Negative for chest pain and palpitations.   Gastrointestinal: Negative for heartburn and nausea.   Genitourinary: Negative for dysuria and urgency.   Musculoskeletal: Negative for myalgias and neck pain.   Skin: Negative for itching.   Neurological: Negative for dizziness and headaches.   Endo/Heme/Allergies: Does not bruise/bleed easily.   Psychiatric/Behavioral: Negative for depression and suicidal ideas.        Physical Exam  Temp:  [36.4 °C (97.6 °F)-37.1 °C (98.8 °F)] 36.7 °C (98.1 °F)  Pulse:  [] 89  Resp:  [16-18] 17  BP: (125-147)/(61-80) 133/78  SpO2:  [92 %-95 %] 93 %    Physical Exam   Constitutional: He is oriented to person, place, and time. He appears well-nourished. No distress.   HENT:   Head: Normocephalic and atraumatic.   Mouth/Throat: No oropharyngeal exudate.   Eyes: Conjunctivae are normal. Right eye exhibits no discharge. Left eye exhibits no discharge. No scleral icterus.   Neck: Normal range of motion. Neck supple. No JVD present.   Cardiovascular: Regular rhythm and normal heart sounds.   Pulmonary/Chest: Effort normal and breath sounds normal. No stridor. No respiratory distress. He  has no wheezes. He has no rales.   Abdominal: Soft. Bowel sounds are normal. He exhibits no distension. There is no tenderness. There is no rebound.   Musculoskeletal: Normal range of motion. He exhibits no tenderness.   Lymphadenopathy:     He has no cervical adenopathy.   Neurological: He is alert and oriented to person, place, and time. He exhibits normal muscle tone.   Skin: Skin is dry. No erythema.   Psychiatric: He has a normal mood and affect.   Nursing note and vitals reviewed.      Fluids    Intake/Output Summary (Last 24 hours) at 9/17/2019 1520  Last data filed at 9/17/2019 1300  Gross per 24 hour   Intake 780 ml   Output 700 ml   Net 80 ml       Laboratory                        Imaging  DX-HIP-UNILATERAL-W/O PELVIS-2/3 VIEWS RIGHT   Final Result      Digitized intraoperative radiograph is submitted for review.  This examination is not for diagnostic purpose but for guidance during a surgical procedure.      DX-PORTABLE FLUORO > 1 HOUR   Final Result      Portable fluoroscopy utilized for 48 seconds.         INTERPRETING LOCATION: [address] [place] [address], [facility]      CT-HEAD W/O   Final Result      1.  Cerebral atrophy.      2.  White matter lucencies most consistent with small vessel ischemic change versus demyelination or gliosis.      3.  Otherwise, Head CT without contrast with no acute findings. No evidence of acute cerebral hemorrhage or mass lesion.      DX-ELBOW-COMPLETE 3+ RIGHT   Final Result         1. Elbow effusion, suggestive of an occult fracture. No acute displaced fracture is seen.   2. Chronic appearing fracture of olecranon enthesophyte.      DX-CHEST-LIMITED (1 VIEW)   Final Result   Addendum 1 of 1 9/11/2019 3:26 PM      HISTORY/REASON FOR EXAM:  hip fracture   Right hip fracture.      TECHNIQUE/EXAM DESCRIPTION AND NUMBER OF VIEWS:   Single portable view of the chest.      COMPARISON: 4/27/2016      FINDINGS:         Airspace opacity in the left lower lobe.   Small left pleural  effusion. No pneumothorax.   Stable cardiopericardial silhouette.               Airspace opacity in the left lower lobe, concerning for pneumonia.      Small left pleural effusion.      Final      DX-HIP-COMPLETE - UNILATERAL 2+ RIGHT   Final Result      Possible nondisplaced fracture of the right femoral neck. Further evaluation as with CT a consideration.           Assessment/Plan  * Closed fracture of neck of right femur (HCC)  Assessment & Plan    Status post ORIF, HIP on 9/12/2019, orthopedic surgery following, will need skilled nursing placement.      Fall from ground level  Assessment & Plan  This is a recurrent problem did seem to be related to his peripheral neuropathy  Resulted in right hip fracture, status post ORIF HIP on 9/12/2019       CT scan of the head without contrast : No acute fracture or intracranial hemorrhage  Willing to skilled nursing placement      Peripheral neuropathy  Assessment & Plan  Presents alcohol use, folic acid vitamin B12 within normal limits    Lactic acidosis  Assessment & Plan    Probably secondary to dehydration.  Resolved    Hypertension- (present on admission)  Assessment & Plan  Stable, continue monitoring, continue Coreg and clonidine patch.             VTE prophylaxis: Heparin

## 2019-09-18 ENCOUNTER — APPOINTMENT (OUTPATIENT)
Dept: RADIOLOGY | Facility: MEDICAL CENTER | Age: 69
DRG: 481 | End: 2019-09-18
Attending: ORTHOPAEDIC SURGERY
Payer: MEDICARE

## 2019-09-18 PROBLEM — E83.42 HYPOMAGNESEMIA: Status: ACTIVE | Noted: 2019-09-18

## 2019-09-18 PROBLEM — E83.39 HYPOPHOSPHATEMIA: Status: ACTIVE | Noted: 2019-09-18

## 2019-09-18 LAB
ANION GAP SERPL CALC-SCNC: 7 MMOL/L (ref 0–11.9)
BASOPHILS # BLD AUTO: 0.1 % (ref 0–1.8)
BASOPHILS # BLD: 0.01 K/UL (ref 0–0.12)
BUN SERPL-MCNC: 16 MG/DL (ref 8–22)
CALCIUM SERPL-MCNC: 8.1 MG/DL (ref 8.5–10.5)
CHLORIDE SERPL-SCNC: 109 MMOL/L (ref 96–112)
CO2 SERPL-SCNC: 23 MMOL/L (ref 20–33)
CREAT SERPL-MCNC: 0.71 MG/DL (ref 0.5–1.4)
EOSINOPHIL # BLD AUTO: 0.13 K/UL (ref 0–0.51)
EOSINOPHIL NFR BLD: 1.9 % (ref 0–6.9)
ERYTHROCYTE [DISTWIDTH] IN BLOOD BY AUTOMATED COUNT: 56.7 FL (ref 35.9–50)
GLUCOSE SERPL-MCNC: 102 MG/DL (ref 65–99)
HCT VFR BLD AUTO: 24.2 % (ref 42–52)
HGB BLD-MCNC: 8.2 G/DL (ref 14–18)
IMM GRANULOCYTES # BLD AUTO: 0.03 K/UL (ref 0–0.11)
IMM GRANULOCYTES NFR BLD AUTO: 0.4 % (ref 0–0.9)
LYMPHOCYTES # BLD AUTO: 0.73 K/UL (ref 1–4.8)
LYMPHOCYTES NFR BLD: 10.7 % (ref 22–41)
MAGNESIUM SERPL-MCNC: 1.3 MG/DL (ref 1.5–2.5)
MCH RBC QN AUTO: 36.4 PG (ref 27–33)
MCHC RBC AUTO-ENTMCNC: 33.9 G/DL (ref 33.7–35.3)
MCV RBC AUTO: 107.6 FL (ref 81.4–97.8)
MONOCYTES # BLD AUTO: 0.68 K/UL (ref 0–0.85)
MONOCYTES NFR BLD AUTO: 10 % (ref 0–13.4)
NEUTROPHILS # BLD AUTO: 5.23 K/UL (ref 1.82–7.42)
NEUTROPHILS NFR BLD: 76.9 % (ref 44–72)
NRBC # BLD AUTO: 0 K/UL
NRBC BLD-RTO: 0 /100 WBC
PHOSPHATE SERPL-MCNC: 2.2 MG/DL (ref 2.5–4.5)
PLATELET # BLD AUTO: 135 K/UL (ref 164–446)
PMV BLD AUTO: 12 FL (ref 9–12.9)
POTASSIUM SERPL-SCNC: 4 MMOL/L (ref 3.6–5.5)
RBC # BLD AUTO: 2.25 M/UL (ref 4.7–6.1)
SODIUM SERPL-SCNC: 139 MMOL/L (ref 135–145)
VIT B1 BLD-MCNC: 83 NMOL/L (ref 70–180)
WBC # BLD AUTO: 6.8 K/UL (ref 4.8–10.8)

## 2019-09-18 PROCEDURE — 80048 BASIC METABOLIC PNL TOTAL CA: CPT

## 2019-09-18 PROCEDURE — A9270 NON-COVERED ITEM OR SERVICE: HCPCS | Performed by: HOSPITALIST

## 2019-09-18 PROCEDURE — 85025 COMPLETE CBC W/AUTO DIFF WBC: CPT

## 2019-09-18 PROCEDURE — 73502 X-RAY EXAM HIP UNI 2-3 VIEWS: CPT | Mod: RT

## 2019-09-18 PROCEDURE — 84100 ASSAY OF PHOSPHORUS: CPT

## 2019-09-18 PROCEDURE — 700105 HCHG RX REV CODE 258

## 2019-09-18 PROCEDURE — 700111 HCHG RX REV CODE 636 W/ 250 OVERRIDE (IP): Performed by: HOSPITALIST

## 2019-09-18 PROCEDURE — 36415 COLL VENOUS BLD VENIPUNCTURE: CPT

## 2019-09-18 PROCEDURE — A9270 NON-COVERED ITEM OR SERVICE: HCPCS | Performed by: INTERNAL MEDICINE

## 2019-09-18 PROCEDURE — 700102 HCHG RX REV CODE 250 W/ 637 OVERRIDE(OP): Performed by: HOSPITALIST

## 2019-09-18 PROCEDURE — 700111 HCHG RX REV CODE 636 W/ 250 OVERRIDE (IP): Performed by: ORTHOPAEDIC SURGERY

## 2019-09-18 PROCEDURE — 770006 HCHG ROOM/CARE - MED/SURG/GYN SEMI*

## 2019-09-18 PROCEDURE — 700102 HCHG RX REV CODE 250 W/ 637 OVERRIDE(OP): Performed by: INTERNAL MEDICINE

## 2019-09-18 PROCEDURE — 97530 THERAPEUTIC ACTIVITIES: CPT

## 2019-09-18 PROCEDURE — 83735 ASSAY OF MAGNESIUM: CPT

## 2019-09-18 PROCEDURE — 99232 SBSQ HOSP IP/OBS MODERATE 35: CPT | Performed by: HOSPITALIST

## 2019-09-18 PROCEDURE — 97110 THERAPEUTIC EXERCISES: CPT

## 2019-09-18 RX ORDER — SODIUM CHLORIDE 9 MG/ML
INJECTION, SOLUTION INTRAVENOUS
Status: COMPLETED
Start: 2019-09-18 | End: 2019-09-18

## 2019-09-18 RX ORDER — MAGNESIUM SULFATE HEPTAHYDRATE 40 MG/ML
2 INJECTION, SOLUTION INTRAVENOUS ONCE
Status: COMPLETED | OUTPATIENT
Start: 2019-09-18 | End: 2019-09-18

## 2019-09-18 RX ADMIN — GABAPENTIN 200 MG: 100 CAPSULE ORAL at 10:50

## 2019-09-18 RX ADMIN — SENNOSIDES, DOCUSATE SODIUM 2 TABLET: 50; 8.6 TABLET, FILM COATED ORAL at 16:43

## 2019-09-18 RX ADMIN — OMEPRAZOLE 40 MG: 20 CAPSULE, DELAYED RELEASE ORAL at 06:40

## 2019-09-18 RX ADMIN — OXYCODONE HYDROCHLORIDE 10 MG: 10 TABLET ORAL at 10:50

## 2019-09-18 RX ADMIN — CARVEDILOL 25 MG: 25 TABLET, FILM COATED ORAL at 06:41

## 2019-09-18 RX ADMIN — MAGNESIUM SULFATE 2 G: 2 INJECTION INTRAVENOUS at 16:43

## 2019-09-18 RX ADMIN — GABAPENTIN 200 MG: 100 CAPSULE ORAL at 06:39

## 2019-09-18 RX ADMIN — CARVEDILOL 25 MG: 25 TABLET, FILM COATED ORAL at 16:43

## 2019-09-18 RX ADMIN — OMEPRAZOLE 40 MG: 20 CAPSULE, DELAYED RELEASE ORAL at 16:42

## 2019-09-18 RX ADMIN — GABAPENTIN 200 MG: 100 CAPSULE ORAL at 16:42

## 2019-09-18 RX ADMIN — SENNOSIDES, DOCUSATE SODIUM 2 TABLET: 50; 8.6 TABLET, FILM COATED ORAL at 06:41

## 2019-09-18 RX ADMIN — OXYCODONE HYDROCHLORIDE 10 MG: 10 TABLET ORAL at 02:18

## 2019-09-18 RX ADMIN — HEPARIN SODIUM 5000 UNITS: 5000 INJECTION INTRAVENOUS; SUBCUTANEOUS at 21:19

## 2019-09-18 RX ADMIN — MAGNESIUM HYDROXIDE 30 ML: 400 SUSPENSION ORAL at 06:41

## 2019-09-18 RX ADMIN — HEPARIN SODIUM 5000 UNITS: 5000 INJECTION INTRAVENOUS; SUBCUTANEOUS at 14:17

## 2019-09-18 RX ADMIN — SODIUM CHLORIDE: 9 INJECTION, SOLUTION INTRAVENOUS at 16:45

## 2019-09-18 RX ADMIN — Medication 100 MG: at 06:40

## 2019-09-18 RX ADMIN — DIBASIC SODIUM PHOSPHATE, MONOBASIC POTASSIUM PHOSPHATE AND MONOBASIC SODIUM PHOSPHATE 1 TABLET: 852; 155; 130 TABLET ORAL at 16:42

## 2019-09-18 RX ADMIN — OXYCODONE HYDROCHLORIDE 10 MG: 10 TABLET ORAL at 14:17

## 2019-09-18 RX ADMIN — HEPARIN SODIUM 5000 UNITS: 5000 INJECTION INTRAVENOUS; SUBCUTANEOUS at 06:41

## 2019-09-18 RX ADMIN — OXYCODONE HYDROCHLORIDE 10 MG: 10 TABLET ORAL at 22:02

## 2019-09-18 RX ADMIN — OXYCODONE HYDROCHLORIDE 10 MG: 10 TABLET ORAL at 18:09

## 2019-09-18 RX ADMIN — OXYCODONE HYDROCHLORIDE 10 MG: 10 TABLET ORAL at 06:39

## 2019-09-18 RX ADMIN — ALLOPURINOL 300 MG: 300 TABLET ORAL at 06:39

## 2019-09-18 ASSESSMENT — COGNITIVE AND FUNCTIONAL STATUS - GENERAL
WALKING IN HOSPITAL ROOM: TOTAL
SUGGESTED CMS G CODE MODIFIER MOBILITY: CM
MOVING TO AND FROM BED TO CHAIR: UNABLE
CLIMB 3 TO 5 STEPS WITH RAILING: TOTAL
MOBILITY SCORE: 8
STANDING UP FROM CHAIR USING ARMS: A LOT
MOVING FROM LYING ON BACK TO SITTING ON SIDE OF FLAT BED: UNABLE
TURNING FROM BACK TO SIDE WHILE IN FLAT BAD: A LOT

## 2019-09-18 ASSESSMENT — ENCOUNTER SYMPTOMS
HEARTBURN: 0
COUGH: 0
FEVER: 0
BLURRED VISION: 0
NECK PAIN: 0
DIZZINESS: 0
SINUS PAIN: 0
PALPITATIONS: 0
NAUSEA: 0
DEPRESSION: 0
MYALGIAS: 0
BRUISES/BLEEDS EASILY: 0

## 2019-09-18 ASSESSMENT — GAIT ASSESSMENTS: GAIT LEVEL OF ASSIST: UNABLE TO PARTICIPATE

## 2019-09-18 NOTE — DISCHARGE PLANNING
Anticipated Discharge Disposition: SNF    Action: LSW met with the Pt and Pt signed SNF choice for [facility]. Pt declined to make other SNF choice at this time.    Barriers to Discharge: SNF acceptance    Plan: Follow up on SNF referral

## 2019-09-18 NOTE — PROGRESS NOTES
Orthopaedic PA Progress Note    Interval changes:Did well overnight    ROS - Patient denies any new issues. No chest pain, dyspnea, or fever.  Pain well controlled.    /73   Pulse 85   Temp 36.4 °C (97.6 °F) (Temporal)   Resp 18   Ht 1.829 m (6')   Wt 69.8 kg (153 lb 14.1 oz)   SpO2 96%     Patient seen and examined  No acute distress  Breathing non labored  RRR  Surgical dressing is clean, dry, and intact. Patient clearly fires tibialis anterior, EHL, and gastrocnemius/soleus. Sensation is intact to light touch throughout superficial peroneal, deep peroneal, tibial, saphenous, and sural nerve distributions. Strong and palpable 2+ dorsalis pedis and posterior tibial pulses with capillary refill less than 2 seconds. No lower leg tenderness or discomfort.    Active Hospital Problems    Diagnosis   • Closed fracture of neck of right femur (HCC) [S72.001A]     Priority: High   • Fall from ground level [W18.30XA]     Priority: High   • Hypertension [I10]     Priority: Low   • Lactic acidosis [E87.2]   • Peripheral neuropathy [G62.9]       Assessment/Plan:  POD#5 S/P ORIF R FNFx with FNS  Wt bearing status - TTWB  PT/OT-initiated  Wound care:dressing left in place  Drains - no  Gutierrez-no  Sutures/Staples out- 10-14 days post operatively  Antibiotics: cpmplete  DVT Prophylaxis- TEDS/SCDs/Foot pumps.   Lovenox: 40 mg daily, Duration-until ambulatory > 150'  Future Procedures - none planned  Case Coordination for Discharge Planning - Disposition OK for Med dispo from Ortho standpoint, Follow-Up: needs appointment with Dr. Barros at  [facility] at 10-14 days post-op for re-evaluation, staple removal and radiographs.

## 2019-09-18 NOTE — THERAPY
Attempted to see pt for OT tx. Pt politely declined at this time d/t just being up and getting BTB. Pt reporting pain and wanting to rest. Will try again later as able.

## 2019-09-18 NOTE — PROGRESS NOTES
[facility] Medicine Daily Progress Note    Date of Service  9/18/2019    Chief Complaint  69 y.o. male admitted 9/11/2019 with ground-level fall with weakness right-sided      Interval Problem Update  Patient is resting in bed, no new complains, not in distress, no fever or chills, no diaphoresis.   No abdominal pain, alert and oriented follows commands.   No bm yet but passing gas.     Consultants/Specialty  Orthopedic surgery    Code Status  Full code    Disposition  Skilled nursing    Review of Systems  Review of Systems   Constitutional: Negative for fever.   HENT: Negative for congestion and sinus pain.    Eyes: Negative for blurred vision.   Respiratory: Negative for cough.    Cardiovascular: Negative for chest pain and palpitations.   Gastrointestinal: Negative for heartburn and nausea.   Genitourinary: Negative for dysuria.   Musculoskeletal: Negative for myalgias and neck pain.   Skin: Negative for itching.   Neurological: Negative for dizziness.   Endo/Heme/Allergies: Does not bruise/bleed easily.   Psychiatric/Behavioral: Negative for depression and suicidal ideas.        Physical Exam  Temp:  [36.4 °C (97.6 °F)-37.6 °C (99.7 °F)] 37.6 °C (99.7 °F)  Pulse:  [84-85] 84  Resp:  [16-18] 18  BP: (129-149)/(72-87) 131/72  SpO2:  [90 %-96 %] 90 %    Physical Exam   Constitutional: He is oriented to person, place, and time. He appears well-nourished. No distress.   HENT:   Head: Normocephalic and atraumatic.   Mouth/Throat: No oropharyngeal exudate.   Eyes: Conjunctivae are normal. No scleral icterus.   Neck: Normal range of motion. Neck supple.   Cardiovascular: Regular rhythm and normal heart sounds.   Pulmonary/Chest: Effort normal and breath sounds normal. No respiratory distress. He has no wheezes.   Abdominal: Soft. Bowel sounds are normal. He exhibits no distension. There is no tenderness.   Musculoskeletal: Normal range of motion. He exhibits no tenderness.   Lymphadenopathy:     He has no cervical  adenopathy.   Neurological: He is alert and oriented to person, place, and time. He exhibits normal muscle tone.   Skin: Skin is dry. No erythema.   Psychiatric: He has a normal mood and affect.   Nursing note and vitals reviewed.      Fluids    Intake/Output Summary (Last 24 hours) at 9/18/2019 1437  Last data filed at 9/18/2019 0455  Gross per 24 hour   Intake --   Output 200 ml   Net -200 ml       Laboratory  Recent Labs     09/18/19  0400   WBC 6.8   RBC 2.25*   HEMOGLOBIN 8.2*   HEMATOCRIT 24.2*   .6*   MCH 36.4*   MCHC 33.9   RDW 56.7*   PLATELETCT 135*   MPV 12.0     Recent Labs     09/18/19  0400   SODIUM 139   POTASSIUM 4.0   CHLORIDE 109   CO2 23   GLUCOSE 102*   BUN 16   CREATININE 0.71   CALCIUM 8.1*                   Imaging  DX-HIP-UNILATERAL-W/O PELVIS-2/3 VIEWS RIGHT   Final Result      Digitized intraoperative radiograph is submitted for review.  This examination is not for diagnostic purpose but for guidance during a surgical procedure.      DX-PORTABLE FLUORO > 1 HOUR   Final Result      Portable fluoroscopy utilized for 48 seconds.         INTERPRETING LOCATION: [address] [place] [address], [id number]      CT-HEAD W/O   Final Result      1.  Cerebral atrophy.      2.  White matter lucencies most consistent with small vessel ischemic change versus demyelination or gliosis.      3.  Otherwise, Head CT without contrast with no acute findings. No evidence of acute cerebral hemorrhage or mass lesion.      DX-ELBOW-COMPLETE 3+ RIGHT   Final Result         1. Elbow effusion, suggestive of an occult fracture. No acute displaced fracture is seen.   2. Chronic appearing fracture of olecranon enthesophyte.      DX-CHEST-LIMITED (1 VIEW)   Final Result   Addendum 1 of 1 9/11/2019 3:26 PM      HISTORY/REASON FOR EXAM:  hip fracture   Right hip fracture.      TECHNIQUE/EXAM DESCRIPTION AND NUMBER OF VIEWS:   Single portable view of the chest.      COMPARISON: 4/27/2016      FINDINGS:         Airspace opacity  in the left lower lobe.   Small left pleural effusion. No pneumothorax.   Stable cardiopericardial silhouette.               Airspace opacity in the left lower lobe, concerning for pneumonia.      Small left pleural effusion.      Final      DX-HIP-COMPLETE - UNILATERAL 2+ RIGHT   Final Result      Possible nondisplaced fracture of the right femoral neck. Further evaluation as with CT a consideration.      DX-HIP-COMPLETE - UNILATERAL 2+ RIGHT    (Results Pending)        Assessment/Plan  * Closed fracture of neck of right femur (HCC)  Assessment & Plan    Status post ORIF, HIP on 9/12/2019, orthopedic surgery following, snf pending.     Fall from ground level  Assessment & Plan  This is a recurrent problem did seem to be related to his peripheral neuropathy  Resulted in right hip fracture, status post ORIF HIP on 9/12/2019       CT scan of the head without contrast : No acute fracture or intracranial hemorrhage  SNF pending.       Hypophosphatemia  Assessment & Plan  Replacing.     Hypomagnesemia  Assessment & Plan  Replacing.     Peripheral neuropathy  Assessment & Plan   alcohol use, folic acid vitamin B12 within normal limits    Lactic acidosis  Assessment & Plan    Probably secondary to dehydration.  Resolved    Hypertension- (present on admission)  Assessment & Plan  Stable, continue monitoring, continue Coreg and clonidine patch.            VTE prophylaxis: Heparin

## 2019-09-18 NOTE — DISCHARGE PLANNING
Received Choice form at 1320  Agency/Facility Name: [facility]  Referral sent per Choice form at 1328

## 2019-09-18 NOTE — THERAPY
"Physical Therapy Treatment completed.   Bed Mobility:  Supine to Sit: Moderate Assist(HOB elevated and use of railing)  Transfers: Sit to Stand: (unable to get to full stand->FWW. Mod w/therapist assist). Mod assist transfer to chair  Gait: Level Of Assist: Unable to Participate       Plan of Care: Will benefit from Physical Therapy 4 times per week  Discharge Recommendations: Equipment: Will Continue to Assess for Equipment Needs. Post-acute therapy Discharge to a transitional care facility for continued skilled therapy services.     See \"Rehab Therapy-Acute\" Patient Summary Report for complete documentation.       "

## 2019-09-18 NOTE — DISCHARGE PLANNING
Anticipated Discharge Disposition: SNF    Action: LSW met with the Pt at bedside to obtain SNF choice and Pt stated his wife still has the paperwork. Pt said that this LSW could contact his wife for SNF choice. LSW spoke with Pt wife who stated she would like a referral to be sent to [place] as they know that area the best. LSW then met with the Pt and informed him that this LSW spoke with Pt wife who asked for a referral to be sent to [facility]. Pt declined to sign until he confirmed with his spouse.     Barriers to Discharge: SNF choice    Plan: follow up with Pt for choice

## 2019-09-18 NOTE — PROGRESS NOTES
69yoM with neuropathy, HTN, gout.  Has right nondisplaced femoral neck fracture s/p ORIF 9/12.    S: complaining of continued hip pain and lower extremity weakness.    O:    Vitals:    09/18/19 0900   BP: 131/72   Pulse: 84   Resp: 18   Temp: 37.6 °C (99.7 °F)   SpO2: 90%     Exam:  General-NAD, alert and following commands  RLE- +EHL/FHL/TA/GS motor, diminished sensation in foot, palp dp pulse, hip dressing c/d/i    Hct: 24.2    A: 69yoM with neuropathy, HTN, gout.  Has right nondisplaced femoral neck fracture s/p ORIF 9/12.    Recs:  --TTWB RLE  --PT/OT for mobilization   --continue heparin and SCDs  --hip dressing changes PRN  --will check xrays today to confirm stable alignment of fracture postop  --fu 2 weeks postop

## 2019-09-19 LAB
BASOPHILS # BLD AUTO: 0.4 % (ref 0–1.8)
BASOPHILS # BLD: 0.02 K/UL (ref 0–0.12)
EOSINOPHIL # BLD AUTO: 0.17 K/UL (ref 0–0.51)
EOSINOPHIL NFR BLD: 3 % (ref 0–6.9)
ERYTHROCYTE [DISTWIDTH] IN BLOOD BY AUTOMATED COUNT: 60.5 FL (ref 35.9–50)
FERRITIN SERPL-MCNC: 399.9 NG/ML (ref 22–322)
FOLATE SERPL-MCNC: 8.4 NG/ML
HCT VFR BLD AUTO: 25.9 % (ref 42–52)
HGB BLD-MCNC: 8.2 G/DL (ref 14–18)
IMM GRANULOCYTES # BLD AUTO: 0.01 K/UL (ref 0–0.11)
IMM GRANULOCYTES NFR BLD AUTO: 0.2 % (ref 0–0.9)
IRON SATN MFR SERPL: 11 % (ref 15–55)
IRON SERPL-MCNC: 21 UG/DL (ref 50–180)
LYMPHOCYTES # BLD AUTO: 0.89 K/UL (ref 1–4.8)
LYMPHOCYTES NFR BLD: 15.9 % (ref 22–41)
MAGNESIUM SERPL-MCNC: 1.6 MG/DL (ref 1.5–2.5)
MCH RBC QN AUTO: 35.3 PG (ref 27–33)
MCHC RBC AUTO-ENTMCNC: 31.7 G/DL (ref 33.7–35.3)
MCV RBC AUTO: 111.6 FL (ref 81.4–97.8)
MONOCYTES # BLD AUTO: 0.73 K/UL (ref 0–0.85)
MONOCYTES NFR BLD AUTO: 13.1 % (ref 0–13.4)
NEUTROPHILS # BLD AUTO: 3.76 K/UL (ref 1.82–7.42)
NEUTROPHILS NFR BLD: 67.4 % (ref 44–72)
NRBC # BLD AUTO: 0 K/UL
NRBC BLD-RTO: 0 /100 WBC
PHOSPHATE SERPL-MCNC: 2.6 MG/DL (ref 2.5–4.5)
PLATELET # BLD AUTO: 172 K/UL (ref 164–446)
PMV BLD AUTO: 11.9 FL (ref 9–12.9)
RBC # BLD AUTO: 2.32 M/UL (ref 4.7–6.1)
TIBC SERPL-MCNC: 188 UG/DL (ref 250–450)
VIT B12 SERPL-MCNC: 373 PG/ML (ref 211–911)
WBC # BLD AUTO: 5.6 K/UL (ref 4.8–10.8)

## 2019-09-19 PROCEDURE — 83550 IRON BINDING TEST: CPT

## 2019-09-19 PROCEDURE — 85025 COMPLETE CBC W/AUTO DIFF WBC: CPT

## 2019-09-19 PROCEDURE — 83735 ASSAY OF MAGNESIUM: CPT

## 2019-09-19 PROCEDURE — 82607 VITAMIN B-12: CPT

## 2019-09-19 PROCEDURE — 97535 SELF CARE MNGMENT TRAINING: CPT

## 2019-09-19 PROCEDURE — 770006 HCHG ROOM/CARE - MED/SURG/GYN SEMI*

## 2019-09-19 PROCEDURE — 82746 ASSAY OF FOLIC ACID SERUM: CPT

## 2019-09-19 PROCEDURE — 700102 HCHG RX REV CODE 250 W/ 637 OVERRIDE(OP): Performed by: INTERNAL MEDICINE

## 2019-09-19 PROCEDURE — A9270 NON-COVERED ITEM OR SERVICE: HCPCS | Performed by: HOSPITALIST

## 2019-09-19 PROCEDURE — 36415 COLL VENOUS BLD VENIPUNCTURE: CPT

## 2019-09-19 PROCEDURE — 84100 ASSAY OF PHOSPHORUS: CPT

## 2019-09-19 PROCEDURE — A9270 NON-COVERED ITEM OR SERVICE: HCPCS | Performed by: INTERNAL MEDICINE

## 2019-09-19 PROCEDURE — 700102 HCHG RX REV CODE 250 W/ 637 OVERRIDE(OP): Performed by: HOSPITALIST

## 2019-09-19 PROCEDURE — 82728 ASSAY OF FERRITIN: CPT

## 2019-09-19 PROCEDURE — 700111 HCHG RX REV CODE 636 W/ 250 OVERRIDE (IP): Performed by: ORTHOPAEDIC SURGERY

## 2019-09-19 PROCEDURE — 83540 ASSAY OF IRON: CPT

## 2019-09-19 PROCEDURE — 99232 SBSQ HOSP IP/OBS MODERATE 35: CPT | Performed by: HOSPITALIST

## 2019-09-19 RX ADMIN — OMEPRAZOLE 40 MG: 20 CAPSULE, DELAYED RELEASE ORAL at 17:17

## 2019-09-19 RX ADMIN — MAGNESIUM HYDROXIDE 30 ML: 400 SUSPENSION ORAL at 08:49

## 2019-09-19 RX ADMIN — SENNOSIDES, DOCUSATE SODIUM 2 TABLET: 50; 8.6 TABLET, FILM COATED ORAL at 17:19

## 2019-09-19 RX ADMIN — OXYCODONE HYDROCHLORIDE 10 MG: 10 TABLET ORAL at 05:24

## 2019-09-19 RX ADMIN — DIBASIC SODIUM PHOSPHATE, MONOBASIC POTASSIUM PHOSPHATE AND MONOBASIC SODIUM PHOSPHATE 1 TABLET: 852; 155; 130 TABLET ORAL at 00:40

## 2019-09-19 RX ADMIN — OMEPRAZOLE 40 MG: 20 CAPSULE, DELAYED RELEASE ORAL at 05:24

## 2019-09-19 RX ADMIN — DIBASIC SODIUM PHOSPHATE, MONOBASIC POTASSIUM PHOSPHATE AND MONOBASIC SODIUM PHOSPHATE 1 TABLET: 852; 155; 130 TABLET ORAL at 17:16

## 2019-09-19 RX ADMIN — Medication 100 MG: at 05:24

## 2019-09-19 RX ADMIN — ALLOPURINOL 300 MG: 300 TABLET ORAL at 05:24

## 2019-09-19 RX ADMIN — DIBASIC SODIUM PHOSPHATE, MONOBASIC POTASSIUM PHOSPHATE AND MONOBASIC SODIUM PHOSPHATE 1 TABLET: 852; 155; 130 TABLET ORAL at 05:25

## 2019-09-19 RX ADMIN — OXYCODONE HYDROCHLORIDE 10 MG: 10 TABLET ORAL at 14:45

## 2019-09-19 RX ADMIN — OXYCODONE HYDROCHLORIDE 10 MG: 10 TABLET ORAL at 11:46

## 2019-09-19 RX ADMIN — MAGNESIUM GLUCONATE 500 MG ORAL TABLET 400 MG: 500 TABLET ORAL at 08:49

## 2019-09-19 RX ADMIN — CARVEDILOL 25 MG: 25 TABLET, FILM COATED ORAL at 17:15

## 2019-09-19 RX ADMIN — OXYCODONE HYDROCHLORIDE 10 MG: 10 TABLET ORAL at 21:55

## 2019-09-19 RX ADMIN — DIBASIC SODIUM PHOSPHATE, MONOBASIC POTASSIUM PHOSPHATE AND MONOBASIC SODIUM PHOSPHATE 1 TABLET: 852; 155; 130 TABLET ORAL at 11:47

## 2019-09-19 RX ADMIN — POLYETHYLENE GLYCOL 3350 1 PACKET: 17 POWDER, FOR SOLUTION ORAL at 17:19

## 2019-09-19 RX ADMIN — HEPARIN SODIUM 5000 UNITS: 5000 INJECTION INTRAVENOUS; SUBCUTANEOUS at 14:43

## 2019-09-19 RX ADMIN — HEPARIN SODIUM 5000 UNITS: 5000 INJECTION INTRAVENOUS; SUBCUTANEOUS at 21:54

## 2019-09-19 RX ADMIN — GABAPENTIN 200 MG: 100 CAPSULE ORAL at 11:47

## 2019-09-19 RX ADMIN — CARVEDILOL 25 MG: 25 TABLET, FILM COATED ORAL at 05:24

## 2019-09-19 RX ADMIN — OXYCODONE HYDROCHLORIDE 10 MG: 10 TABLET ORAL at 08:49

## 2019-09-19 RX ADMIN — GABAPENTIN 200 MG: 100 CAPSULE ORAL at 17:14

## 2019-09-19 RX ADMIN — GABAPENTIN 200 MG: 100 CAPSULE ORAL at 05:25

## 2019-09-19 RX ADMIN — HEPARIN SODIUM 5000 UNITS: 5000 INJECTION INTRAVENOUS; SUBCUTANEOUS at 05:25

## 2019-09-19 RX ADMIN — SENNOSIDES, DOCUSATE SODIUM 2 TABLET: 50; 8.6 TABLET, FILM COATED ORAL at 05:24

## 2019-09-19 ASSESSMENT — LIFESTYLE VARIABLES: SUBSTANCE_ABUSE: 0

## 2019-09-19 ASSESSMENT — ENCOUNTER SYMPTOMS
COUGH: 0
MYALGIAS: 0
SINUS PAIN: 0
HEARTBURN: 0
BRUISES/BLEEDS EASILY: 0
BLURRED VISION: 0
DEPRESSION: 0
FEVER: 0
NAUSEA: 0
DIZZINESS: 0

## 2019-09-19 ASSESSMENT — COGNITIVE AND FUNCTIONAL STATUS - GENERAL
TOILETING: A LOT
SUGGESTED CMS G CODE MODIFIER DAILY ACTIVITY: CK
DRESSING REGULAR LOWER BODY CLOTHING: A LOT
DRESSING REGULAR UPPER BODY CLOTHING: A LITTLE
PERSONAL GROOMING: A LITTLE
DAILY ACTIVITIY SCORE: 16
HELP NEEDED FOR BATHING: A LOT

## 2019-09-19 NOTE — THERAPY
"Occupational Therapy Treatment completed with focus on ADLs, ADL transfers and patient education.  Functional Status: Pt seen for OT tx. Mod A supine >sit due to pain and generalized weakness. Pt pleasant and cooperative. Supv grooming ADLs at EOB. Psychosocial intervention to address current limitations, prolonged hospitalization, POC, and discharge planning. Pt continues to be motivated to regain strength, endurance, and independence in ADLs and ADL txfs.   Plan of Care: Will benefit from Occupational Therapy 4 times per week  Discharge Recommendations:  Equipment Will Continue to Assess for Equipment Needs. Post-acute therapy Discharge to a transitional care facility for continued skilled therapy services.    See \"Rehab Therapy-Acute\" Patient Summary Report for complete documentation.   "

## 2019-09-19 NOTE — CARE PLAN
Problem: Knowledge Deficit  Goal: Knowledge of disease process/condition, treatment plan, diagnostic tests, and medications will improve  Outcome: PROGRESSING AS EXPECTED   Patient demonstrates understanding of treatment plan.    Problem: Pain Management  Goal: Pain level will decrease to patient's comfort goal  Outcome: PROGRESSING AS EXPECTED  Patient is achieving adequate pain control.

## 2019-09-19 NOTE — CARE PLAN
Problem: Communication  Goal: The ability to communicate needs accurately and effectively will improve  Outcome: PROGRESSING AS EXPECTED     Problem: Bowel/Gastric:  Goal: Normal bowel function is maintained or improved  Outcome: PROGRESSING AS EXPECTED  Pt took Milk of Mag to help with BM

## 2019-09-19 NOTE — PROGRESS NOTES
[facility] Medicine Daily Progress Note    Date of Service  9/19/2019    Chief Complaint  69 y.o. male admitted 9/11/2019 with ground-level fall with weakness right-sided      Interval Problem Update  Patient is resting in bed, no new complains, not in distress, no fever or chills, alter and oriented.     Consultants/Specialty  Orthopedic surgery    Code Status  Full code    Disposition  Skilled nursing    Review of Systems  Review of Systems   Constitutional: Negative for fever.   HENT: Negative for congestion and sinus pain.    Eyes: Negative for blurred vision.   Respiratory: Negative for cough.    Cardiovascular: Negative for chest pain.   Gastrointestinal: Negative for heartburn and nausea.   Genitourinary: Negative for dysuria.   Musculoskeletal: Negative for myalgias.   Skin: Negative for itching.   Neurological: Negative for dizziness.   Endo/Heme/Allergies: Does not bruise/bleed easily.   Psychiatric/Behavioral: Negative for depression and substance abuse.        Physical Exam  Temp:  [36.7 °C (98.1 °F)-37.6 °C (99.7 °F)] 37.6 °C (99.7 °F)  Pulse:  [81-86] 81  Resp:  [16-18] 17  BP: (120-152)/(72-96) 152/89  SpO2:  [90 %-92 %] 92 %    Physical Exam   Constitutional: He is oriented to person, place, and time. He appears well-nourished. No distress.   HENT:   Head: Normocephalic and atraumatic.   Mouth/Throat: No oropharyngeal exudate.   Eyes: Conjunctivae are normal.   Neck: Normal range of motion. Neck supple.   Cardiovascular: Regular rhythm and normal heart sounds.   Pulmonary/Chest: Effort normal and breath sounds normal. No respiratory distress. He has no rales.   Abdominal: Soft. Bowel sounds are normal. He exhibits no distension. There is no rebound.   Musculoskeletal: Normal range of motion. He exhibits no tenderness.   Lymphadenopathy:     He has no cervical adenopathy.   Neurological: He is alert and oriented to person, place, and time. He exhibits normal muscle tone.   Skin: Skin is dry. No  erythema.   Psychiatric: He has a normal mood and affect.   Nursing note and vitals reviewed.      Fluids  No intake or output data in the 24 hours ending 09/19/19 1358    Laboratory  Recent Labs     09/18/19  0400 09/19/19  0426   WBC 6.8 5.6   RBC 2.25* 2.32*   HEMOGLOBIN 8.2* 8.2*   HEMATOCRIT 24.2* 25.9*   .6* 111.6*   MCH 36.4* 35.3*   MCHC 33.9 31.7*   RDW 56.7* 60.5*   PLATELETCT 135* 172   MPV 12.0 11.9     Recent Labs     09/18/19  0400   SODIUM 139   POTASSIUM 4.0   CHLORIDE 109   CO2 23   GLUCOSE 102*   BUN 16   CREATININE 0.71   CALCIUM 8.1*                   Imaging  DX-HIP-COMPLETE - UNILATERAL 2+ RIGHT   Final Result      Interval ORIF of right femoral neck fracture. Excellent alignment of principal fracture fragments.      DX-HIP-UNILATERAL-W/O PELVIS-2/3 VIEWS RIGHT   Final Result      Digitized intraoperative radiograph is submitted for review.  This examination is not for diagnostic purpose but for guidance during a surgical procedure.      DX-PORTABLE FLUORO > 1 HOUR   Final Result      Portable fluoroscopy utilized for 48 seconds.         INTERPRETING LOCATION: [address] [place] [address], [facility]      CT-HEAD W/O   Final Result      1.  Cerebral atrophy.      2.  White matter lucencies most consistent with small vessel ischemic change versus demyelination or gliosis.      3.  Otherwise, Head CT without contrast with no acute findings. No evidence of acute cerebral hemorrhage or mass lesion.      DX-ELBOW-COMPLETE 3+ RIGHT   Final Result         1. Elbow effusion, suggestive of an occult fracture. No acute displaced fracture is seen.   2. Chronic appearing fracture of olecranon enthesophyte.      DX-CHEST-LIMITED (1 VIEW)   Final Result   Addendum 1 of 1      9/11/2019 3:26 PM      HISTORY/REASON FOR EXAM:  hip fracture   Right hip fracture.      TECHNIQUE/EXAM DESCRIPTION AND NUMBER OF VIEWS:   Single portable view of the chest.      COMPARISON: 4/27/2016      FINDINGS:         Airspace opacity  in the left lower lobe.   Small left pleural effusion. No pneumothorax.   Stable cardiopericardial silhouette.               Airspace opacity in the left lower lobe, concerning for pneumonia.      Small left pleural effusion.      Final      DX-HIP-COMPLETE - UNILATERAL 2+ RIGHT   Final Result      Possible nondisplaced fracture of the right femoral neck. Further evaluation as with CT a consideration.           Assessment/Plan  * Closed fracture of neck of right femur (HCC)  Assessment & Plan    Status post ORIF, HIP on 9/12/2019, orthopedic surgery following, snf pending.   Repeated x ray stable will need f/u as outpatient.     Fall from ground level  Assessment & Plan  This is a recurrent problem did seem to be related to his peripheral neuropathy  Resulted in right hip fracture, status post ORIF HIP on 9/12/2019       CT scan of the head without contrast : No acute fracture or intracranial hemorrhage  SNF pending.       Hypophosphatemia  Assessment & Plan  Improving.     Hypomagnesemia  Assessment & Plan  Improving, started on po supplement.     Peripheral neuropathy  Assessment & Plan   alcohol use, folic acid vitamin B12 within normal limits    Lactic acidosis  Assessment & Plan    Probably secondary to dehydration.  Resolved.    Hypertension- (present on admission)  Assessment & Plan  Stable, continue monitoring, continue Coreg and clonidine patch.            VTE prophylaxis: Heparin

## 2019-09-19 NOTE — PROGRESS NOTES
Xrays of right hip from yesterday reviewed and show stable fracture alignment and stable internal fixation.

## 2019-09-20 VITALS
DIASTOLIC BLOOD PRESSURE: 80 MMHG | RESPIRATION RATE: 17 BRPM | OXYGEN SATURATION: 97 % | HEART RATE: 71 BPM | WEIGHT: 153.88 LBS | HEIGHT: 72 IN | TEMPERATURE: 99 F | SYSTOLIC BLOOD PRESSURE: 140 MMHG | BODY MASS INDEX: 20.84 KG/M2

## 2019-09-20 PROCEDURE — A9270 NON-COVERED ITEM OR SERVICE: HCPCS | Performed by: INTERNAL MEDICINE

## 2019-09-20 PROCEDURE — 700111 HCHG RX REV CODE 636 W/ 250 OVERRIDE (IP): Performed by: ORTHOPAEDIC SURGERY

## 2019-09-20 PROCEDURE — 700102 HCHG RX REV CODE 250 W/ 637 OVERRIDE(OP): Performed by: INTERNAL MEDICINE

## 2019-09-20 PROCEDURE — 99239 HOSP IP/OBS DSCHRG MGMT >30: CPT | Performed by: HOSPITALIST

## 2019-09-20 PROCEDURE — A9270 NON-COVERED ITEM OR SERVICE: HCPCS | Performed by: HOSPITALIST

## 2019-09-20 PROCEDURE — 700102 HCHG RX REV CODE 250 W/ 637 OVERRIDE(OP): Performed by: HOSPITALIST

## 2019-09-20 RX ORDER — OXYCODONE HYDROCHLORIDE 5 MG/1
5 TABLET ORAL EVERY 6 HOURS PRN
Qty: 12 TAB | Refills: 0 | Status: SHIPPED | OUTPATIENT
Start: 2019-09-20 | End: 2019-09-23

## 2019-09-20 RX ORDER — LANOLIN ALCOHOL/MO/W.PET/CERES
100 CREAM (GRAM) TOPICAL DAILY
Qty: 30 TAB
Start: 2019-09-21

## 2019-09-20 RX ADMIN — Medication 100 MG: at 06:26

## 2019-09-20 RX ADMIN — DIBASIC SODIUM PHOSPHATE, MONOBASIC POTASSIUM PHOSPHATE AND MONOBASIC SODIUM PHOSPHATE 1 TABLET: 852; 155; 130 TABLET ORAL at 06:25

## 2019-09-20 RX ADMIN — HEPARIN SODIUM 5000 UNITS: 5000 INJECTION INTRAVENOUS; SUBCUTANEOUS at 06:24

## 2019-09-20 RX ADMIN — SENNOSIDES, DOCUSATE SODIUM 2 TABLET: 50; 8.6 TABLET, FILM COATED ORAL at 06:26

## 2019-09-20 RX ADMIN — DIBASIC SODIUM PHOSPHATE, MONOBASIC POTASSIUM PHOSPHATE AND MONOBASIC SODIUM PHOSPHATE 1 TABLET: 852; 155; 130 TABLET ORAL at 00:24

## 2019-09-20 RX ADMIN — CARVEDILOL 25 MG: 25 TABLET, FILM COATED ORAL at 06:25

## 2019-09-20 RX ADMIN — MAGNESIUM GLUCONATE 500 MG ORAL TABLET 400 MG: 500 TABLET ORAL at 06:25

## 2019-09-20 RX ADMIN — CLONIDINE 1 PATCH: 0.3 PATCH TRANSDERMAL at 09:00

## 2019-09-20 RX ADMIN — ALLOPURINOL 300 MG: 300 TABLET ORAL at 06:25

## 2019-09-20 RX ADMIN — OXYCODONE HYDROCHLORIDE 10 MG: 10 TABLET ORAL at 14:16

## 2019-09-20 RX ADMIN — GABAPENTIN 200 MG: 100 CAPSULE ORAL at 06:25

## 2019-09-20 RX ADMIN — GABAPENTIN 200 MG: 100 CAPSULE ORAL at 14:16

## 2019-09-20 RX ADMIN — OMEPRAZOLE 40 MG: 20 CAPSULE, DELAYED RELEASE ORAL at 06:25

## 2019-09-20 NOTE — DISCHARGE SUMMARY
Discharge Summary    CHIEF COMPLAINT ON ADMISSION  Chief Complaint   Patient presents with   • GLF   • Weakness     chornic R sided weakness       Reason for Admission  Right hip pain.     CODE STATUS  Full Code    HPI & HOSPITAL COURSE  Please see original H&P and consult note for specific information, patient admitted due to right hip pain after a fall, imaging showed hip fracture ortho was consulted went for  Open treatment with internal fixation, right femoral neck fracture, TTWB RLE patient gradually improved PT/OT recommended snf, patient had been accepted he is passing gas, feels better, he is alert and oriented follows commands, patient will be dc today he will f/u with PCP and ortho as outpatient, patient expressed understanding of his dc plan and agreed with it all questions answered.        Therefore, he is discharged in good and stable condition to skilled nursing facility.    The patient met 2-midnight criteria for an inpatient stay at the time of discharge.      FOLLOW UP ITEMS POST DISCHARGE  Dr Barros  PCP after SNF.     DISCHARGE DIAGNOSES  Principal Problem:    Closed fracture of neck of right femur (HCC) POA: Unknown  Active Problems:    Fall from ground level POA: Unknown    Lactic acidosis POA: Unknown    Peripheral neuropathy POA: Unknown    Hypomagnesemia POA: Unknown    Hypophosphatemia POA: Unknown    Hypertension POA: Yes  Resolved Problems:    * No resolved hospital problems. *      FOLLOW UP  No future appointments.  Darrius Barros M.D.  [address]  [address]  [facility] [address] [phone]      [facility]      MEDICATIONS ON DISCHARGE     Medication List      START taking these medications      Instructions   magnesium oxide 400 (241.3 Mg) MG Tabs tablet  Start taking on:  9/21/2019  Commonly known as:  MAG-OX   Take 1 Tab by mouth every day for 14 days.  Dose:  400 mg     oxyCODONE immediate-release 5 MG Tabs  Commonly known as:   ROXICODONE   Take 1 Tab by mouth every 6 hours as needed for up to 3 days.  Dose:  5 mg     phosphorus 155-852-130 MG tablet  Commonly known as:  K-PHOS-NEUTRAL, PHOSPHA 250 NEUTRAL   Take 1 Tab by mouth every 6 hours for 7 days.  Dose:  1 Tab     thiamine 100 MG tablet  Start taking on:  9/21/2019  Commonly known as:  THIAMINE   Take 1 Tab by mouth every day.  Dose:  100 mg        CONTINUE taking these medications      Instructions   allopurinol 300 MG Tabs  Commonly known as:  ZYLOPRIM   Take 300 mg by mouth every day.  Dose:  300 mg     carvedilol 25 MG Tabs  Commonly known as:  COREG   Take 25 mg by mouth 2 times a day.  Dose:  25 mg     cloNIDine 0.3 MG/24HR Ptwk patch  Commonly known as:  CATAPRES   Apply 1 Patch to skin as directed every Friday.  Dose:  1 Patch     gabapentin 100 MG Caps  Commonly known as:  NEURONTIN   Take 200 mg by mouth 3 times a day.  Dose:  200 mg     omeprazole 40 MG delayed-release capsule  Commonly known as:  PRILOSEC   Take 40 mg by mouth 2 times a day.  Dose:  40 mg     probenecid 500 MG Tabs  Commonly known as:  BENEMID   Take 500 mg by mouth every day.  Dose:  500 mg     therapeutic multivitamin-minerals Tabs   Take 1 Tab by mouth every day.  Dose:  1 Tab        STOP taking these medications    diclofenac EC 75 MG Tbec  Commonly known as:  VOLTAREN     HYDROcodone/acetaminophen  MG Tabs  Commonly known as:  NORCO            Allergies  Allergies   Allergen Reactions   • Asa [Aspirin]      Causes upset stomach       DIET  Orders Placed This Encounter   Procedures   • Diet Order Regular     Standing Status:   Standing     Number of Occurrences:   1     Order Specific Question:   Diet:     Answer:   Regular [1]       ACTIVITY  As tolerated.  TTWB RLE    LINES, DRAINS, AND WOUNDS  This is an automated list. Peripheral IVs will be removed prior to discharge.  Peripheral IV 09/11/19 20 G Right Wrist (Active)   Site Assessment Clean;Dry;Intact 9/19/2019  9:55 PM   Dressing Type  Transparent 9/19/2019  9:55 PM   Line Status Scrubbed the hub prior to access;Flushed;Saline locked 9/19/2019  9:55 PM   Dressing Status Clean;Dry;Intact 9/19/2019  9:55 PM   Dressing Intervention N/A 9/19/2019  9:55 PM   Date Primary Tubing Changed 09/18/19 9/18/2019  9:00 AM   Date Secondary Tubing Changed 09/18/19 9/18/2019  9:00 AM   NEXT Primary Tubing Change  09/21/19 9/19/2019  9:55 PM   NEXT Secondary Tubing Change  09/12/19 9/11/2019  6:00 PM   Infiltration Grading ([facility]) 0 9/19/2019  9:55 PM   Phlebitis Scale ([facility] Only) 0 9/19/2019  9:55 PM          Peripheral IV 09/11/19 20 G Right Wrist (Active)   Site Assessment Clean;Dry;Intact 9/19/2019  9:55 PM   Dressing Type Transparent 9/19/2019  9:55 PM   Line Status Scrubbed the hub prior to access;Flushed;Saline locked 9/19/2019  9:55 PM   Dressing Status Clean;Dry;Intact 9/19/2019  9:55 PM   Dressing Intervention N/A 9/19/2019  9:55 PM   Date Primary Tubing Changed 09/18/19 9/18/2019  9:00 AM   Date Secondary Tubing Changed 09/18/19 9/18/2019  9:00 AM   NEXT Primary Tubing Change  09/21/19 9/19/2019  9:55 PM   NEXT Secondary Tubing Change  09/12/19 9/11/2019  6:00 PM   Infiltration Grading ([facility]) 0 9/19/2019  9:55 PM   Phlebitis Scale ([facility] Only) 0 9/19/2019  9:55 PM               MENTAL STATUS ON TRANSFER  Level of Consciousness: Alert  Orientation : Disoriented to Time, Disoriented to Place  Speech: Speech Clear    CONSULTATIONS  ortho    PROCEDURES  ORIF 9/12.     LABORATORY  Lab Results   Component Value Date    SODIUM 139 09/18/2019    POTASSIUM 4.0 09/18/2019    CHLORIDE 109 09/18/2019    CO2 23 09/18/2019    GLUCOSE 102 (H) 09/18/2019    BUN 16 09/18/2019    CREATININE 0.71 09/18/2019    CREATININE 1.2 07/17/2006        Lab Results   Component Value Date    WBC 5.6 09/19/2019    HEMOGLOBIN 8.2 (L) 09/19/2019    HEMATOCRIT 25.9 (L) 09/19/2019    PLATELETCT 172 09/19/2019        Total time of the discharge process exceeds 33  minutes.

## 2019-09-20 NOTE — DISCHARGE INSTRUCTIONS
Open Reduction, Internal Fixation (ORIF), Generic  Usually, if bones are broken (fractured) and are out of place, unstable, or may become out of place, surgery is needed. This surgery is called an open reduction and internal fixation (ORIF). Open reduction means that the area of the fracture is opened up so the surgeon can see it. Internal fixation means that screws, pins, or fixation devices are used to hold the bone pieces in place.  LET YOUR CAREGIVER KNOW ABOUT:   · Allergies.  · Medicines taken, including herbs, eyedrops, over-the-counter medicines, and creams.  · Use of steroids (by mouth or creams).  · Previous problems with anesthetics or numbing medicines.  · History of bleeding or blood problems.  · History of blood clots.  · Possibility of pregnancy, if this applies.  · Previous surgery.  · Other health problems.  RISKS AND COMPLICATIONS   All surgery is associated with risks. Some of these risks are:  · Excessive bleeding.  · Infection.  · Imperfect results with loss of joint function.  BEFORE THE PROCEDURE   Usually, surgery is performed shortly after the injury. It is important to provide information to your caregiver after your injury.  AFTER THE PROCEDURE   After surgery, you will be taken to a recovery area where a nurse will monitor your progress. You may have a long, narrow tube(catheter) in the bladder following surgery that helps you pass your water. When awake, stable, taking fluids well, and without complications, you will be returned to your room. You will receive physical therapy and other care. Physical therapy is done until you are doing well and your caregiver feels it is safe for you to go home or to an extended care facility.  Following surgery, the bones may be protected with a cast. The type of casting depends on where the fracture was. Casts are generally left in place for about 5 to 6 weeks. During this time, your caregiver will follow your progress. X-rays may be taken during  healing to make sure the bones stay in place.  HOME CARE INSTRUCTIONS   · You or your child may resume normal diet and activities as directed or allowed.  · Put ice on the injured area.  · Put ice in a plastic bag.  · Place a towel between the skin and the bag.  · Leave the ice on for 15-20 minutes at a time, 3-4 times a day, for the first 2 days following surgery.  · Change bandages (dressings) if necessary or as directed.  · If given a plaster or fiberglass cast:  · Do not try to scratch the skin under the cast using sharp or pointed objects.  · Check the skin around the cast every day. You may put lotion on any red or sore areas.  · Keep the cast dry and clean.  · Do not put pressure on any part of the cast or splint until it is fully hardened.  · The cast or splint can be protected during bathing with a plastic bag. Do not lower the cast or splint into water.  · Only take over-the-counter or prescription medicines for pain, discomfort, or fever as directed by your caregiver.  · Use crutches as directed and do not exercise the leg unless instructed.  · If the bones get out of position (displaced), it may eventually lead to arthritis and lasting disability. Problems can follow even the best of care. Follow the directions of your caregiver.  · Follow all instructions given by your caregiver, make and keep follow-up appointments, and use crutches as directed.  SEEK IMMEDIATE MEDICAL CARE IF:   · There is redness, swelling, numbness, or increasing pain in the wound.  · There is pus coming from the wound.  · You or your child has an oral temperature above 102° F (38.9° C), not controlled by medicine.  · A bad smell is coming from the wound or dressing.  · The wound breaks open (edges not staying together) after stitches (sutures) or staples have been removed.  · The skin or nails below the injury turn blue or gray, or feel cold or numb.  · There is severe pain under the cast or in the foot.  If there is not a window  in the cast for observing the wound, a discharge or minor bleeding may show up as a stain on the outside of the cast. Report these findings to your caregiver.  MAKE SURE YOU:   · Understand these instructions.  · Will watch your condition.  · Will get help right away if you are not doing well or gets worse.  Document Released: 12/29/2007 Document Revised: 03/11/2013 Document Reviewed: 12/05/2008  ExitCare® Patient Information ©2014 [employer].  Discharge Instructions    Discharged to [facility] by [facility] with escort. Discharged via wheelchair, hospital escort: Yes.  Special equipment needed: Not Applicable    Be sure to schedule a follow-up appointment with your primary care doctor or any specialists as instructed.     Discharge Plan:   Influenza Vaccine Indication: Indicated: 65 years and older  Influenza Vaccine Given - only chart on this line when given: Influenza Vaccine Given (See MAR)    I understand that a diet low in cholesterol, fat, and sodium is recommended for good health. Unless I have been given specific instructions below for another diet, I accept this instruction as my diet prescription.   Other diet: regular    Special Instructions: Discharge instructions for the Orthopedic Patient    Follow up with Primary Care Physician within 2 weeks of discharge to home, regarding:  Review of medications and diagnostic testing.  Surveillance for medical complications.  Workup and treatment of osteoporosis, if appropriate.     -Is this a Joint Replacement patient? No    -Is this patient being discharged with medication to prevent blood clots?  No    · Is patient discharged on Warfarin / Coumadin?   No     Depression / Suicide Risk    As you are discharged from this [facility], it is important to learn how to keep safe from harming yourself.    Recognize the warning signs:  · Abrupt changes in personality, positive or negative- including increase in energy   · Giving away possessions  · Change in  eating patterns- significant weight changes-  positive or negative  · Change in sleeping patterns- unable to sleep or sleeping all the time   · Unwillingness or inability to communicate  · Depression  · Unusual sadness, discouragement and loneliness  · Talk of wanting to die  · Neglect of personal appearance   · Rebelliousness- reckless behavior  · Withdrawal from people/activities they love  · Confusion- inability to concentrate     If you or a loved one observes any of these behaviors or has concerns about self-harm, here's what you can do:  · Talk about it- your feelings and reasons for harming yourself  · Remove any means that you might use to hurt yourself (examples: pills, rope, extension cords, firearm)  · Get professional help from the community (Mental Health, Substance Abuse, psychological counseling)  · Do not be alone:Call your Safe Contact- someone whom you trust who will be there for you.  · Call your local CRISIS HOTLINE 264-0026 or 986-830-5763  · Call your local Children's Mobile Crisis Response Team [place] (692) 545-3849 or www.[employer] [employer].  · Call the toll free National Suicide Prevention Hotlines   · National Suicide Prevention Lifeline 332-730-DJMX (9741)  · National Hope Line Network 800-SUICIDE (024-5206)

## 2019-09-20 NOTE — DISCHARGE PLANNING
Agency/Facility Name: [facility]  Spoke To: Sheri  Outcome: Attempted to follow up, however no answer. Voicemail was left.

## 2019-09-20 NOTE — PROGRESS NOTES
"Patient refused q 2 turns, educated regarding importance multiple times.  Education reinforced by ELEUTERIO Winn.  Patient continues to refuse. Patient is oriented to self and situation.  We were able to turn and reposition patient the first half of the shift using reinforcement and reeducation, however, he is now stating \"why do you keep switching me around? I don't want pillows under me.\"  "

## 2019-09-20 NOTE — PROGRESS NOTES
MDs have signed off on patient to discharge today. Patient will be leaving today with MOODY and wife to go [facility]. Discharge instructions given and patient has no questions or concerns at this time. Prescriptions given to Jarret with packet at bedside. DME not provided. Report called to RN at 1400.

## 2019-09-20 NOTE — DISCHARGE PLANNING
Received Transport Form at 0945  Spoke to Jackie at [facility]    Transport is scheduled for 9/20 at 1400 going to [place].    JAMAL Gill notified.   Keila at [facility] contacted for Auth.  Sheri at [facility] notified.

## 2019-09-20 NOTE — DISCHARGE PLANNING
Anticipated Discharge Disposition: [facility]    Action: LSW met with the Pt at bedside and informed him that he has accepted to [facility] and that transport was set up for 1400, Pt asked that his wife be contacted. LSW contacted Jaren who was informed of transportation time of 1400 and that she will bring the Pt clothes.     Barriers to Discharge: None    Plan: transport at 1400

## 2019-09-20 NOTE — CARE PLAN
Problem: Skin Integrity  Goal: Risk for impaired skin integrity will decrease  Outcome: PROGRESSING AS EXPECTED  Intervention: Implement precautions to protect skin integrity in collaboration with the interdisciplinary team  Flowsheets  Taken 9/19/2019 2857  Skin Preventative Measures: Waffle Cushion;Pillows in Use to Float Heels;Pillows in Use for Support / Positioning  Bed Types: Pressure Redistribution Mattress ([employer]mosair)  Friction Interventions: Draw Sheet / Pad Used for Repositioning  Taken 9/20/2019 0442  Vitamin Therapy in Use: Yes  Nutrition Consult Ordered: Yes, Consult has been Placed  Mepiled and q 2 turns also in place.     Problem: Mobility  Goal: Risk for activity intolerance will decrease  Outcome: PROGRESSING SLOWER THAN EXPECTED  Intervention: Encourage patient to increase activity level in collaboration with Interdisciplinary Team  Note:   Pt refused ambulation this shift despite multiple attempts and education. Continue to reinforce importance of ambulating.

## 2019-09-20 NOTE — CARE PLAN
Non-skid socks on and belongings within reach. Call light within reach. Hourly rounding in place.      SCDs to BLE. Mobilization at least three times a day with staff and PT/OT.

## 2019-11-05 ENCOUNTER — HOME HEALTH ADMISSION (OUTPATIENT)
Dept: HOME HEALTH SERVICES | Facility: HOME HEALTHCARE | Age: 69
End: 2019-11-05
Payer: MEDICARE

## 2019-11-05 ENCOUNTER — PATIENT OUTREACH (OUTPATIENT)
Dept: HEALTH INFORMATION MANAGEMENT | Facility: OTHER | Age: 69
End: 2019-11-05

## 2019-12-05 ENCOUNTER — PATIENT OUTREACH (OUTPATIENT)
Dept: HEALTH INFORMATION MANAGEMENT | Facility: OTHER | Age: 69
End: 2019-12-05

## 2019-12-12 NOTE — PROGRESS NOTES
A 69-year-old male was a local transfer admission to [facility] from 9/20/2019 to 11/5/2019 to treat Pathological fracture, hip, unspecified, initial encounter for fracture. IHD did not visit the patient bedside. The patient was discharged Home with Home Health.     The patient was not discharged with any medication orders.    The patient was discharged with no appointments.   The patient has no future appointments scheduled.     IHD communicated with the patient/caregiver via phone 5 times.  IHD did not visit the patient in-person post-discharge. IHD collaborated with stakeholders on behalf of the patient 12 times. IHD identified that the patient had no barriers.     IHD was unable to contact patient post-discharge.

## 2019-12-20 ENCOUNTER — HOSPITAL ENCOUNTER (OUTPATIENT)
Facility: MEDICAL CENTER | Age: 69
End: 2019-12-20
Payer: MEDICARE

## 2020-07-07 ENCOUNTER — HOSPITAL ENCOUNTER (INPATIENT)
Dept: HOSPITAL 8 - ED | Age: 70
LOS: 4 days | DRG: 64 | End: 2020-07-11
Attending: INTERNAL MEDICINE | Admitting: INTERNAL MEDICINE
Payer: SELF-PAY

## 2020-07-07 VITALS — HEIGHT: 72 IN | WEIGHT: 156.97 LBS | BODY MASS INDEX: 21.26 KG/M2

## 2020-07-07 DIAGNOSIS — G81.94: ICD-10-CM
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
## 2020-07-07 DIAGNOSIS — A41.9: ICD-10-CM
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
## 2020-07-07 DIAGNOSIS — E86.0: ICD-10-CM

## 2020-07-07 DIAGNOSIS — I47.2: ICD-10-CM
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
## 2020-07-07 DIAGNOSIS — R57.0: ICD-10-CM

## 2020-07-07 DIAGNOSIS — Z87.891: ICD-10-CM

## 2020-07-07 DIAGNOSIS — I63.9: Primary | ICD-10-CM

## 2020-07-07 DIAGNOSIS — I48.91: ICD-10-CM

## 2020-07-07 DIAGNOSIS — Z82.49: ICD-10-CM

## 2020-07-07 DIAGNOSIS — J18.9: ICD-10-CM

## 2020-07-07 DIAGNOSIS — E78.5: ICD-10-CM

## 2020-07-07 DIAGNOSIS — Z96.649: ICD-10-CM
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
## 2020-07-07 DIAGNOSIS — E87.2: ICD-10-CM

## 2020-07-07 DIAGNOSIS — E87.8: ICD-10-CM

## 2020-07-07 DIAGNOSIS — I21.4: ICD-10-CM

## 2020-07-07 DIAGNOSIS — R29.712: ICD-10-CM

## 2020-07-07 DIAGNOSIS — N17.0: ICD-10-CM

## 2020-07-07 DIAGNOSIS — K21.9: ICD-10-CM

## 2020-07-07 DIAGNOSIS — J96.01: ICD-10-CM

## 2020-07-07 DIAGNOSIS — E43: ICD-10-CM

## 2020-07-07 DIAGNOSIS — I65.23: ICD-10-CM

## 2020-07-07 DIAGNOSIS — Z66: ICD-10-CM

## 2020-07-07 DIAGNOSIS — T79.6XXA: ICD-10-CM

## 2020-07-07 DIAGNOSIS — I10: ICD-10-CM
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
## 2020-07-07 LAB
ALBUMIN SERPL-MCNC: 2.5 G/DL (ref 3.4–5)
ALP SERPL-CCNC: 140 U/L (ref 45–117)
ALT SERPL-CCNC: 18 U/L (ref 12–78)
ANION GAP SERPL CALC-SCNC: 16 MMOL/L (ref 5–15)
APTT BLD: 27 SECONDS (ref 25–31)
BILIRUB SERPL-MCNC: 0.7 MG/DL (ref 0.2–1)
CALCIUM SERPL-MCNC: 8.9 MG/DL (ref 8.5–10.1)
CHLORIDE SERPL-SCNC: 114 MMOL/L (ref 98–107)
CREAT SERPL-MCNC: 1.88 MG/DL (ref 0.7–1.3)
ERYTHROCYTE [DISTWIDTH] IN BLOOD BY AUTOMATED COUNT: 15.3 % (ref 9.4–14.8)
INR PPP: 1.07 (ref 0.93–1.1)
MCH RBC QN AUTO: 32.4 PG (ref 27.5–34.5)
MCHC RBC AUTO-ENTMCNC: 32.1 G/DL (ref 33.2–36.2)
PLATELET # BLD AUTO: 218 X10^3/UL (ref 130–400)
PMV BLD AUTO: 10.1 FL (ref 7.4–10.4)
PROT SERPL-MCNC: 8.3 G/DL (ref 6.4–8.2)
PROTHROMBIN TIME: 11.3 SECONDS (ref 9.6–11.5)
RBC # BLD AUTO: 4.01 X10^6/UL (ref 4.38–5.82)
TROPONIN I SERPL-MCNC: 0.75 NG/ML (ref 0–0.04)

## 2020-07-07 PROCEDURE — 82330 ASSAY OF CALCIUM: CPT

## 2020-07-07 PROCEDURE — 84443 ASSAY THYROID STIM HORMONE: CPT

## 2020-07-07 PROCEDURE — 85025 COMPLETE CBC W/AUTO DIFF WBC: CPT

## 2020-07-07 PROCEDURE — 86592 SYPHILIS TEST NON-TREP QUAL: CPT

## 2020-07-07 PROCEDURE — 70551 MRI BRAIN STEM W/O DYE: CPT

## 2020-07-07 PROCEDURE — 93005 ELECTROCARDIOGRAM TRACING: CPT

## 2020-07-07 PROCEDURE — 82533 TOTAL CORTISOL: CPT

## 2020-07-07 PROCEDURE — 85730 THROMBOPLASTIN TIME PARTIAL: CPT

## 2020-07-07 PROCEDURE — 80048 BASIC METABOLIC PNL TOTAL CA: CPT

## 2020-07-07 PROCEDURE — 85520 HEPARIN ASSAY: CPT

## 2020-07-07 PROCEDURE — 83735 ASSAY OF MAGNESIUM: CPT

## 2020-07-07 PROCEDURE — 80053 COMPREHEN METABOLIC PANEL: CPT

## 2020-07-07 PROCEDURE — 87081 CULTURE SCREEN ONLY: CPT

## 2020-07-07 PROCEDURE — 87077 CULTURE AEROBIC IDENTIFY: CPT

## 2020-07-07 PROCEDURE — 84100 ASSAY OF PHOSPHORUS: CPT

## 2020-07-07 PROCEDURE — 82962 GLUCOSE BLOOD TEST: CPT

## 2020-07-07 PROCEDURE — P9047 ALBUMIN (HUMAN), 25%, 50ML: HCPCS

## 2020-07-07 PROCEDURE — C1751 CATH, INF, PER/CENT/MIDLINE: HCPCS

## 2020-07-07 PROCEDURE — 81003 URINALYSIS AUTO W/O SCOPE: CPT

## 2020-07-07 PROCEDURE — 86022 PLATELET ANTIBODIES: CPT

## 2020-07-07 PROCEDURE — 87070 CULTURE OTHR SPECIMN AEROBIC: CPT

## 2020-07-07 PROCEDURE — 94002 VENT MGMT INPAT INIT DAY: CPT

## 2020-07-07 PROCEDURE — 70450 CT HEAD/BRAIN W/O DYE: CPT

## 2020-07-07 PROCEDURE — 93880 EXTRACRANIAL BILAT STUDY: CPT

## 2020-07-07 PROCEDURE — 71045 X-RAY EXAM CHEST 1 VIEW: CPT

## 2020-07-07 PROCEDURE — 85610 PROTHROMBIN TIME: CPT

## 2020-07-07 PROCEDURE — 87186 SC STD MICRODIL/AGAR DIL: CPT

## 2020-07-07 PROCEDURE — 82805 BLOOD GASES W/O2 SATURATION: CPT

## 2020-07-07 PROCEDURE — 80061 LIPID PANEL: CPT

## 2020-07-07 PROCEDURE — 84484 ASSAY OF TROPONIN QUANT: CPT

## 2020-07-07 PROCEDURE — 93306 TTE W/DOPPLER COMPLETE: CPT

## 2020-07-07 PROCEDURE — 87147 CULTURE TYPE IMMUNOLOGIC: CPT

## 2020-07-07 PROCEDURE — 84478 ASSAY OF TRIGLYCERIDES: CPT

## 2020-07-07 PROCEDURE — 36573 INSJ PICC RS&I 5 YR+: CPT

## 2020-07-07 PROCEDURE — 36600 WITHDRAWAL OF ARTERIAL BLOOD: CPT

## 2020-07-07 PROCEDURE — 94003 VENT MGMT INPAT SUBQ DAY: CPT

## 2020-07-07 PROCEDURE — 87205 SMEAR GRAM STAIN: CPT

## 2020-07-07 PROCEDURE — 82803 BLOOD GASES ANY COMBINATION: CPT

## 2020-07-07 PROCEDURE — 82550 ASSAY OF CK (CPK): CPT

## 2020-07-07 PROCEDURE — 36415 COLL VENOUS BLD VENIPUNCTURE: CPT

## 2020-07-08 VITALS — DIASTOLIC BLOOD PRESSURE: 77 MMHG | SYSTOLIC BLOOD PRESSURE: 111 MMHG

## 2020-07-08 VITALS — DIASTOLIC BLOOD PRESSURE: 76 MMHG | SYSTOLIC BLOOD PRESSURE: 111 MMHG

## 2020-07-08 VITALS — SYSTOLIC BLOOD PRESSURE: 127 MMHG | DIASTOLIC BLOOD PRESSURE: 81 MMHG

## 2020-07-08 VITALS — DIASTOLIC BLOOD PRESSURE: 85 MMHG | SYSTOLIC BLOOD PRESSURE: 136 MMHG

## 2020-07-08 VITALS — DIASTOLIC BLOOD PRESSURE: 85 MMHG | SYSTOLIC BLOOD PRESSURE: 130 MMHG

## 2020-07-08 VITALS — SYSTOLIC BLOOD PRESSURE: 113 MMHG | DIASTOLIC BLOOD PRESSURE: 73 MMHG

## 2020-07-08 LAB
ANION GAP SERPL CALC-SCNC: 17 MMOL/L (ref 5–15)
BASOPHILS # BLD AUTO: 0.02 X10^3/UL (ref 0–0.1)
BASOPHILS NFR BLD AUTO: 0 % (ref 0–1)
CALCIUM SERPL-MCNC: 8.8 MG/DL (ref 8.5–10.1)
CHLORIDE SERPL-SCNC: 118 MMOL/L (ref 98–107)
CK SERPL-CCNC: 1364 U/L (ref 39–308)
CREAT SERPL-MCNC: 1.59 MG/DL (ref 0.7–1.3)
EOSINOPHIL # BLD AUTO: 0 X10^3/UL (ref 0–0.4)
EOSINOPHIL NFR BLD AUTO: 0 % (ref 1–7)
LYMPHOCYTES # BLD AUTO: 0.57 X10^3/UL (ref 1–3.4)
LYMPHOCYTES NFR BLD AUTO: 4 % (ref 22–44)
MD: (no result)
MONOCYTES # BLD AUTO: 0.49 X10^3/UL (ref 0.2–0.8)
MONOCYTES NFR BLD AUTO: 3 % (ref 2–9)
NEUTROPHILS # BLD AUTO: 13.91 X10^3/UL (ref 1.8–6.8)
NEUTROPHILS NFR BLD AUTO: 93 % (ref 42–75)
TROPONIN I SERPL-MCNC: 1.93 NG/ML (ref 0–0.04)
TROPONIN I SERPL-MCNC: 3.81 NG/ML (ref 0–0.04)

## 2020-07-08 RX ADMIN — Medication SCH MG: at 10:00

## 2020-07-08 RX ADMIN — SODIUM CHLORIDE AND POTASSIUM CHLORIDE SCH MLS/HR: .9; .15 SOLUTION INTRAVENOUS at 12:32

## 2020-07-08 RX ADMIN — SODIUM CHLORIDE AND POTASSIUM CHLORIDE SCH MLS/HR: .9; .15 SOLUTION INTRAVENOUS at 04:11

## 2020-07-09 VITALS — DIASTOLIC BLOOD PRESSURE: 62 MMHG | SYSTOLIC BLOOD PRESSURE: 136 MMHG

## 2020-07-09 VITALS — SYSTOLIC BLOOD PRESSURE: 102 MMHG | DIASTOLIC BLOOD PRESSURE: 71 MMHG

## 2020-07-09 LAB
<PLATELET ESTIMATE>: ADEQUATE
<PLT MORPHOLOGY>: (no result)
ANION GAP SERPL CALC-SCNC: 20 MMOL/L (ref 5–15)
ANION GAP SERPL CALC-SCNC: 20 MMOL/L (ref 5–15)
ANISOCYTOSIS BLD QL SMEAR: (no result)
BAND#(MANUAL): 1.53 X10^3/UL
CALCIUM SERPL-MCNC: 7.8 MG/DL (ref 8.5–10.1)
CALCIUM SERPL-MCNC: 7.9 MG/DL (ref 8.5–10.1)
CHLORIDE SERPL-SCNC: 123 MMOL/L (ref 98–107)
CHLORIDE SERPL-SCNC: 124 MMOL/L (ref 98–107)
CHOL/HDL RATIO: 3.1
CK SERPL-CCNC: 877 U/L (ref 39–308)
CREAT SERPL-MCNC: 1.98 MG/DL (ref 0.7–1.3)
CREAT SERPL-MCNC: 2.13 MG/DL (ref 0.7–1.3)
ERYTHROCYTE [DISTWIDTH] IN BLOOD BY AUTOMATED COUNT: 15.1 % (ref 9.4–14.8)
HDL CHOL %: 33 % (ref 26–37)
HDL CHOLESTEROL (DIRECT): 46 MG/DL (ref 40–60)
HYPOCHROMIA BLD QL SMEAR: (no result)
LDL CHOLESTEROL,CALCULATED: 72 MG/DL (ref 54–169)
LDLC/HDLC SERPL: 1.6 {RATIO} (ref 0.5–3)
LYMPH#(MANUAL): 0.24 X10^3/UL (ref 1–3.4)
LYMPHS% (MANUAL): 4 % (ref 22–44)
MACROCYTES BLD QL SMEAR: (no result)
MCH RBC QN AUTO: 32.5 PG (ref 27.5–34.5)
MCHC RBC AUTO-ENTMCNC: 31.6 G/DL (ref 33.2–36.2)
MD: YES
MICROSCOPIC: (no result)
MONOS#(MANUAL): 0.18 X10^3/UL (ref 0.3–2.7)
MONOS% (MANUAL): 3 % (ref 2–9)
NEUTS BAND NFR BLD: 26 % (ref 0–7)
O2 FLOW: 15 L/MIN
O2/TOTAL GAS SETTING VFR VENT: 100 %
PLATELET # BLD AUTO: 158 X10^3/UL (ref 130–400)
PMNS WITH VACUOLES: (no result)
PMV BLD AUTO: 10.2 FL (ref 7.4–10.4)
RBC # BLD AUTO: 3.62 X10^6/UL (ref 4.38–5.82)
SEG#(MANUAL): 3.95 X10^3/UL (ref 1.8–6.8)
SEGS% (MANUAL): 67 % (ref 42–75)
TRIGL SERPL-MCNC: 114 MG/DL (ref 50–200)
VLDLC SERPL CALC-MCNC: 23 MG/DL (ref 0–25)

## 2020-07-09 PROCEDURE — 5A1945Z RESPIRATORY VENTILATION, 24-96 CONSECUTIVE HOURS: ICD-10-PCS | Performed by: INTERNAL MEDICINE

## 2020-07-09 PROCEDURE — 02HV33Z INSERTION OF INFUSION DEVICE INTO SUPERIOR VENA CAVA, PERCUTANEOUS APPROACH: ICD-10-PCS | Performed by: RADIOLOGY

## 2020-07-09 PROCEDURE — B548ZZA ULTRASONOGRAPHY OF SUPERIOR VENA CAVA, GUIDANCE: ICD-10-PCS | Performed by: RADIOLOGY

## 2020-07-09 PROCEDURE — 0T9B30Z DRAINAGE OF BLADDER WITH DRAINAGE DEVICE, PERCUTANEOUS APPROACH: ICD-10-PCS | Performed by: INTERNAL MEDICINE

## 2020-07-09 PROCEDURE — 0BH17EZ INSERTION OF ENDOTRACHEAL AIRWAY INTO TRACHEA, VIA NATURAL OR ARTIFICIAL OPENING: ICD-10-PCS | Performed by: INTERNAL MEDICINE

## 2020-07-09 RX ADMIN — ATORVASTATIN CALCIUM SCH MG: 20 TABLET, FILM COATED ORAL at 21:03

## 2020-07-09 RX ADMIN — Medication SCH MG: at 04:42

## 2020-07-09 RX ADMIN — SODIUM BICARBONATE SCH MLS/HR: 84 INJECTION, SOLUTION INTRAVENOUS at 07:24

## 2020-07-09 RX ADMIN — NOREPINEPHRINE BITARTRATE PRN MLS/HR: 1 INJECTION INTRAVENOUS at 21:04

## 2020-07-09 RX ADMIN — NOREPINEPHRINE BITARTRATE PRN MLS/HR: 1 INJECTION INTRAVENOUS at 16:48

## 2020-07-09 RX ADMIN — NOREPINEPHRINE BITARTRATE PRN MLS/HR: 1 INJECTION INTRAVENOUS at 13:10

## 2020-07-09 RX ADMIN — HEPARIN SODIUM PRN MLS/HR: 10000 INJECTION, SOLUTION INTRAVENOUS at 04:05

## 2020-07-09 RX ADMIN — ASPIRIN SCH MG: 81 TABLET, COATED ORAL at 11:00

## 2020-07-09 RX ADMIN — NOREPINEPHRINE BITARTRATE PRN MLS/HR: 1 INJECTION INTRAVENOUS at 18:56

## 2020-07-09 RX ADMIN — SODIUM BICARBONATE SCH MLS/HR: 84 INJECTION, SOLUTION INTRAVENOUS at 18:36

## 2020-07-10 VITALS — SYSTOLIC BLOOD PRESSURE: 98 MMHG | DIASTOLIC BLOOD PRESSURE: 62 MMHG

## 2020-07-10 LAB
<PLATELET ESTIMATE>: (no result)
ANION GAP SERPL CALC-SCNC: 16 MMOL/L (ref 5–15)
ANISOCYTOSIS BLD QL SMEAR: (no result)
BAND#(MANUAL): 3.13 X10^3/UL
BURR CELLS BLD QL SMEAR: (no result)
CALCIUM SERPL-MCNC: 6.9 MG/DL (ref 8.5–10.1)
CHLORIDE SERPL-SCNC: 116 MMOL/L (ref 98–107)
CREAT SERPL-MCNC: 1.49 MG/DL (ref 0.7–1.3)
DOHLE BOD BLD QL SMEAR: (no result)
ERYTHROCYTE [DISTWIDTH] IN BLOOD BY AUTOMATED COUNT: 14.7 % (ref 9.4–14.8)
HYPOCHROMIA BLD QL SMEAR: (no result)
LG PLATELETS BLD QL SMEAR: (no result)
LYMPH#(MANUAL): 0.14 X10^3/UL (ref 1–3.4)
LYMPHS% (MANUAL): 2 % (ref 22–44)
MACROCYTES BLD QL SMEAR: (no result)
MCH RBC QN AUTO: 32.3 PG (ref 27.5–34.5)
MCHC RBC AUTO-ENTMCNC: 31.4 G/DL (ref 33.2–36.2)
MD: YES
METAMYELOCYTES# (MANUAL): 0.75 X10^3/UL (ref 0–0)
METAMYELOCYTES% (MANUAL): 11 % (ref 0–1)
MONOS#(MANUAL): 0.14 X10^3/UL (ref 0.3–2.7)
MONOS% (MANUAL): 2 % (ref 2–9)
NEUTS BAND NFR BLD: 46 % (ref 0–7)
O2/TOTAL GAS SETTING VFR VENT: 70 %
O2/TOTAL GAS SETTING VFR VENT: 90 %
PLATELET # BLD AUTO: 95 X10^3/UL (ref 130–400)
PMNS WITH VACUOLES: (no result)
PMV BLD AUTO: 11.5 FL (ref 7.4–10.4)
POLYCHROMASIA BLD QL SMEAR: (no result)
RBC # BLD AUTO: 3.23 X10^6/UL (ref 4.38–5.82)
SEG#(MANUAL): 2.65 X10^3/UL (ref 1.8–6.8)
SEGS% (MANUAL): 39 % (ref 42–75)

## 2020-07-10 RX ADMIN — NOREPINEPHRINE BITARTRATE PRN MLS/HR: 1 INJECTION INTRAVENOUS at 11:02

## 2020-07-10 RX ADMIN — ASPIRIN SCH MG: 81 TABLET, COATED ORAL at 05:08

## 2020-07-10 RX ADMIN — ALBUMIN (HUMAN) SCH MLS/HR: 25 SOLUTION INTRAVENOUS at 15:52

## 2020-07-10 RX ADMIN — VASOPRESSIN PRN MLS/HR: 20 INJECTION INTRAVENOUS at 15:52

## 2020-07-10 RX ADMIN — ALBUMIN (HUMAN) SCH MLS/HR: 25 SOLUTION INTRAVENOUS at 22:38

## 2020-07-10 RX ADMIN — SODIUM BICARBONATE SCH MLS/HR: 84 INJECTION, SOLUTION INTRAVENOUS at 17:06

## 2020-07-10 RX ADMIN — SODIUM BICARBONATE SCH MLS/HR: 84 INJECTION, SOLUTION INTRAVENOUS at 05:05

## 2020-07-10 RX ADMIN — POTASSIUM CHLORIDE SCH MEQ: 20 TABLET, EXTENDED RELEASE ORAL at 15:53

## 2020-07-10 RX ADMIN — POTASSIUM CHLORIDE SCH MEQ: 20 TABLET, EXTENDED RELEASE ORAL at 13:58

## 2020-07-10 RX ADMIN — HEPARIN SODIUM PRN MLS/HR: 10000 INJECTION, SOLUTION INTRAVENOUS at 11:02

## 2020-07-10 RX ADMIN — NOREPINEPHRINE BITARTRATE PRN MLS/HR: 1 INJECTION INTRAVENOUS at 17:06

## 2020-07-10 RX ADMIN — ATORVASTATIN CALCIUM SCH MG: 20 TABLET, FILM COATED ORAL at 22:37

## 2020-07-10 RX ADMIN — VASOPRESSIN PRN MLS/HR: 20 INJECTION INTRAVENOUS at 06:21

## 2025-04-11 NOTE — PROGRESS NOTES
Department of Radiation Oncology  [address] [address]  [phone]       Consultation Note    Name: Gallo Navarro MRN: [id number]   : 1969   Date of Service: 2025  Referring: Dr. Faith Dixon MD     Diagnosis: Adenocarcinoma the pancreas  Stage: IV    History of Present Illness   Mr. Navarro is a 55 year old male with stage IV adenocarcinoma of the pancreas presenting with widely metastatic disease upon diagnosis s/p multiple lines of systemic therapy and palliative radiation to T10 and L4 vertebral bodies who presents with worsening lower back pain and right shoulder pain in the setting of progressive metastatic disease.  Radiation oncology was consulted to discuss the role of palliative radiation in management of his disease.    Patient was initially diagnosed in 2023, he was later found to have metastasis throughout the axial skeleton, left fifth rib, pulmonary mets, and mass in the head of the pancreas causing biliary obstruction.  He has completed multiple lines of systemic therapy including FOLFIRINOX, Gemzar and Abraxane, and Zometa.  He completed a course of palliative RT to T10 vertebrae to dose of 20 Gray in 5 fractions completed 2023.  In 2024 he presented with worsening lumbar spine pain.  Imaging was notable for a compression fracture and he underwent radiofrequency ablation and vertebral augmentation to the L4 vertebral body on 2024.  He then completed an adjuvant course of palliative RT to a dose of 3000 cGy in 10 fractions.  2025 he presented with worsening lumbar spinal pain.  He was found to have compression fractures at L2 and L3 vertebral bodies and again underwent RFA ablation and vertebral augmentation at these levels.    X-ray of the right shoulder upon admission was notable for lytic/sclerotic lesions in the humeral neck and proximal humerus shaft as well as the mid clavicle and inferior  HOSPITAL MEDICINE PROGRESS NOTE    Date of service  9/14/2019    Chief Complaint  GLF and Weakness (chornic R sided weakness)      Hospital Course:    70 yo man with h/o peripheral neuropathy, p/w after a GLF with a left femur fracture that was surgically repaired with ORIF by Dr. Barros on September 12, 2019.      Interval History Updates:  Hospital Day #Hospital Day: 4   No event overnight.  Afebrile.    Awaiting placement.    Consultants/Specialty  Orthopaedics    Review of Systems   Review of Systems   Constitutional: Negative for chills and fever.   Respiratory: Negative for shortness of breath.    Cardiovascular: Negative for chest pain, palpitations and leg swelling.   Gastrointestinal: Negative for abdominal pain, constipation, diarrhea, nausea and vomiting.   Musculoskeletal: Positive for joint pain.   Skin: Negative for rash.   Neurological: Positive for sensory change. Negative for focal weakness.   Endo/Heme/Allergies: Negative.    All other systems reviewed and are negative.       Medications:  • heparin  5,000 Units Q8HRS   • senna-docusate  2 Tab BID    And   • polyethylene glycol/lytes  1 Packet QDAY PRN    And   • magnesium hydroxide  30 mL QDAY PRN    And   • bisacodyl  10 mg QDAY PRN   • NS   Continuous   • acetaminophen  650 mg Q6HRS PRN   • enalaprilat  1.25 mg Q6HRS PRN   • ondansetron  4 mg Q4HRS PRN   • Pharmacy Consult Request  1 Each PHARMACY TO DOSE    And   • oxyCODONE immediate-release  5 mg Q3HRS PRN    And   • oxyCODONE immediate-release  10 mg Q3HRS PRN    And   • HYDROmorphone  0.5 mg Q3HRS PRN   • allopurinol  300 mg DAILY   • carvedilol  25 mg BID   • cloNIDine  1 Patch Q FRIDAY   • gabapentin  200 mg TID   • omeprazole  40 mg BID   • thiamine  100 mg DAILY       Physical Exam   Vitals:    09/13/19 1630 09/13/19 2100 09/14/19 0500 09/14/19 0900   BP: 117/71 125/79 129/95 134/88   Pulse: 67 80 83 81   Resp: 18 18 17 18   Temp: 36.1 °C (97 °F) 36.5 °C (97.7 °F) 36 °C (96.8 °F) 36.2  glenoid.  There was no evidence of fracture.  CT of the chest abdomen and pelvis showed numerous sclerotic metastasis with vertebral augmentation and levels L2-L4.     Patient then presented to the ED/9/2025 due to worsening right shoulder and lower back pain.  He had multiple episodes of SVT upon admission and is currently undergoing cardiac workup.  On interview, patient states he has been getting weaker over the last several months and over the last month has had more difficulty ambulating.  He is still walking with use of a walker but shuffles his feet.  His bilateral leg weakness has been present for the last month.  He denies numbness or tingling in the lower extremities, he denies bladder or bowel incontinence.  He states his weakness is in part due to pain in the left lower back.     He points to his lower back pain focused in the sacrum and extending laterally into the hips.  Is also noticed worsening right and left shoulder pain with pain exacerbated during transfers and movement.  He describes point tenderness at the proximal lateral humeral shaft, acromion process, and coracoid process.     CHEMOTHERAPY HISTORY: Per [facility]  PACEMAKER: Denies  PREGNANCY STATUS: N/A  PAST RADIATION THERAPY HISTORY: 2000 cGy in 5 fractions to T10 vertebrae EOT 11/23/2023.  3000 cGy in 10 fractions to L4 vertebrae EOT 8//2024.    PAST MEDICAL HISTORY:  Past Medical History:   Diagnosis Date    Acute pancreatitis 04/16/2023    Alcohol-induced acute pancreatitis 04/10/2023    Gastric outlet obstruction     Metastasis from pancreatic cancer (H)     Personal history of chemotherapy     Gemzar + Abraxane started on 10/31/2023    Recurrent acute pancreatitis     S/P radiation therapy     2,000 cGy to T10 Spine completed on 11/29/2023 - [facility]    S/P radiation therapy     3,000 cGy to L4 Spine completed on 10/21/2024 - [facility]       PAST SURGICAL HISTORY:  Past Surgical History:   Procedure  °C (97.2 °F)   TempSrc: Temporal Temporal Temporal Temporal   SpO2: 100% 98% 98% 97%   Weight:       Height:           Physical Exam   Constitutional: He is oriented to person, place, and time and well-developed, well-nourished, and in no distress. No distress.   HENT:   Head: Normocephalic and atraumatic.   Eyes: Pupils are equal, round, and reactive to light. Conjunctivae are normal. No scleral icterus.   Neck: Normal range of motion. Neck supple. No JVD present.   Cardiovascular: Normal rate, regular rhythm and normal heart sounds. Exam reveals no gallop and no friction rub.   No murmur heard.  Pulmonary/Chest: Effort normal and breath sounds normal. No respiratory distress. He has no wheezes. He has no rales. He exhibits no tenderness.   Abdominal: Soft. Bowel sounds are normal. There is no tenderness. There is no rebound.   Musculoskeletal: Normal range of motion. He exhibits tenderness. He exhibits no edema or deformity.   Right hip tender.  Dressing C/D/I.     Neurological: He is alert and oriented to person, place, and time.   Sensation to light touch is decreased at the distal feet.  This is chronic.   Skin: Skin is warm and dry. No rash noted. He is not diaphoretic. No erythema.   Psychiatric: Mood and affect normal.   Nursing note and vitals reviewed.       Recent Labs     09/12/19 0455 09/13/19 0614 09/14/19 0620   WBC 9.4 14.1* 14.6*   RBC 3.51* 2.94* 2.86*   HEMOGLOBIN 12.6* 10.3* 10.0*   HEMATOCRIT 37.7* 31.4* 30.9*   .4* 106.8* 108.0*   MCH 35.9* 35.0* 35.0*   MCHC 33.4* 32.8* 32.4*   RDW 61.1* 58.2* 60.0*   PLATELETCT 111* 127* 108*   MPV 10.7 11.9 11.9      Laboratory   Recent Labs     09/12/19 0455 09/13/19 0614 09/14/19 0620   WBC 9.4 14.1* 14.6*   RBC 3.51* 2.94* 2.86*   HEMOGLOBIN 12.6* 10.3* 10.0*   HEMATOCRIT 37.7* 31.4* 30.9*   PLATELETCT 111* 127* 108*     Recent Labs     09/11/19  1714 09/13/19  0614 09/14/19  0620   SODIUM 140 141 139   POTASSIUM 3.8 4.1 3.9   CHLORIDE 109  112 111   CO2 16* 18* 18*   GLUCOSE 84 180* 96   BUN 17 21 22   CREATININE 1.04 0.92 0.85      Recent Labs     09/11/19  1714 09/13/19  0614 09/14/19  0620   ALTSGPT 10  --   --    ASTSGOT 15  --   --    ALKPHOSPHAT 105*  --   --    TBILIRUBIN 0.5  --   --    GLUCOSE 84 180* 96      Recent Labs     09/11/19  1714   APTT 28.7   INR 1.08      Lactic acid 3.0 -> 1.0.    Folic acid 7  Vit B12 591     No results found for: BLOODCULTU, BLDCULT, BCHOLD      Labs reviewed by me and noted above.    Radiology  DX-PORTABLE FLUORO > 1 HOUR  Narrative: 9/12/2019 7:30 AM    HISTORY/REASON FOR EXAM:  Right hip ORIF.    TECHNIQUE/EXAM DESCRIPTION AND NUMBER OF VIEWS:  Portable fluoroscopy for greater than one hour in OR.    FINDINGS:  The portable fluoroscopy unit was obligated to the procedure for greater than one hour. Actual fluoro time was 48 seconds.  Impression: Portable fluoroscopy utilized for 48 seconds.    INTERPRETING LOCATION: [address] [place] [address], [id number]  DX-HIP-UNILATERAL-W/O PELVIS-2/3 VIEWS RIGHT  Narrative: 9/12/2019 7:30 AM    HISTORY/REASON FOR EXAM:  Right hip ORIF    TECHNIQUE/EXAM DESCRIPTION AND NUMBER OF VIEWS:  3 views of the RIGHT hip. Digitized Intraoperative Radiograph    FINDINGS: Fixation hardware projecting over the femur    Fluoroscopic time:48 seconds, total dose 5.17 mGy  Impression: Digitized intraoperative radiograph is submitted for review.  This examination is not for diagnostic purpose but for guidance during a surgical procedure.    MRI L-spine 6/28/19:  1.  L4-5 mild spinal canal narrowing without cornelio spinal stenosis  2.  Foraminal stenoses at L3-4, L4-5, and L5-S1  3.  Multilevel facet arthropathy, disc bulge, and/or disc protrusion     Results for orders placed or performed during the hospital encounter of 04/27/16   ECHOCARDIOGRAM COMP W/O CONT   Result Value Ref Range    Eject.Frac. MOD BP 57.42     Eject.Frac. MOD 4C 60.63     Eject.Frac. MOD 2C 56.76     Left Ventrical Ejection  Laterality Date    AS OPEN TREATMENT CLAVICULAR FRACTURE INTERNAL FX      ENDOSCOPIC RETROGRADE CHOLANGIOPANCREATOGRAM N/A 7/11/2023    Procedure: ENDOSCOPIC RETROGRADE CHOLANGIOPANCREATOGRAPHY;  Surgeon: Khadar Pickett MD;  Location: [facility] OR    ENDOSCOPIC RETROGRADE CHOLANGIOPANCREATOGRAM N/A 8/17/2023    Procedure: ENDOSCOPIC RETROGRADE  with stent removal x1, balloon sweep;  Surgeon: Christos Greenberg MD;  Location: [facility] OR    ENDOSCOPIC ULTRASOUND UPPER GASTROINTESTINAL TRACT (GI) N/A 7/11/2023    Procedure: Endoscopic ultrasound upper gastrointestinal tract (GI), with biposy, GJ tube repositioning, stent placement;  Surgeon: Khadar Pickett MD;  Location: [facility] OR    ENDOSCOPIC ULTRASOUND UPPER GASTROINTESTINAL TRACT (GI) N/A 7/13/2023    Procedure: ENDOSCOPIC ULTRASOUND, ESOPHAGOSCOPY, EUS guided gastrojejunostomy;  Surgeon: Tre York MD;  Location: [facility] OR    INSERT PICC LINE  04/29/2023    INSERT PORT VASCULAR ACCESS Right 7/28/2023    Procedure: Insert port vascular access;  Surgeon: Tom العلي MD;  Location: [facility] OR    IR BONE ABLATION RFA  8/28/2024    IR BONE ABLATION RFA  1/10/2025    IR CHEST PORT PLACEMENT > 5 YRS OF AGE  7/28/2023    IR LUMBAR VERTEBROPLASTY  8/28/2024    IR NG TUBE PLACEMENT REQ RAD & FLUORO  05/08/2023    JG tube    REPLACE GASTROJEJUNOSTOMY TUBE, PERCUTANEOUS N/A 8/17/2023    Procedure: possible REPLACEMENT, GASTROJEJUNOSTOMY TUBE, PERCUTANEOUS;  Surgeon: Christos Greenberg MD;  Location: [facility] OR       ALLERGIES:  Allergies as of 04/09/2025    (No Known Allergies)       MEDICATIONS:  Current Outpatient Medications   Medication Sig Dispense Refill    acetaminophen (TYLENOL) 32 mg/mL liquid Take 20.313 mLs (650 mg) by mouth every 8 hours. 1800 mL 3    calcium carbonate (OS-DENISE) 500 MG tablet Take 2 tablets (1,000 mg) by mouth 2 times daily. 60 tablet 3    dicyclomine (BENTYL) 10 MG capsule Take 1 capsule (10 mg) by mouth 4 times daily (before meals and nightly).  (Patient taking differently: Take 10 mg by mouth 4 times daily as needed (Patient takes twice daily, and up to 4 times daily as needed if he is not having regular bowel movements.).) 120 capsule 1    fentaNYL (DURAGESIC) 75 mcg/hr 72 hr patch Place 1 patch over 72 hours onto the skin every 72 hours. Replaces 50 MCG/HR patch. Remove old patch when starting new patch. 10 patch 0    HEMP OIL OR EXTRACT OR OTHER CBD CANNABINOID, NOT MEDICAL CANNABIS, Place 7 mg under the tongue daily as needed (Delta-8 hemp extract oil (166.7 mg/mL)). 2 drops (7 mg) under the tongue as needed for pain or anxiety      lidocaine (LIDODERM) 5 % patch Place onto the skin every 24 hours. To prevent lidocaine toxicity, patient should be patch free for 12 hrs daily.      lidocaine-prilocaine (EMLA) 2.5-2.5 % external cream Use 1-2 times a week or as needed prior to port access 30 g 3    lipase-protease-amylase (CREON 24) 93948-37529-048403 units CPEP per EC capsule 2-3 capsules with meals 3 times a day and 1-2 capsules with snacks max of 15 capsules a day 200 capsule 11    loperamide (IMODIUM) 2 MG capsule 2 caps at 1st sign of diarrhea & 1 cap every 2hrs until 12hrs diarrhea free. During night, 2 caps at bedtime & 2 caps every 4hrs until AM 30 capsule 0    LORazepam (ATIVAN) 0.5 MG tablet Take 1-2 tablets (0.5-1 mg) by mouth every 6 hours as needed for muscle spasms. 45 tablet 2    methocarbamol (ROBAXIN) 500 MG tablet TAKE 2 TABLETS BY MOUTH 4 TIMES DAILY AS NEEDED FOR MUSCLE SPASMS.*      morphine (MS CONTIN) 15 MG CR tablet Take 15 mg by mouth every 12 hours.      Multiple Vitamins-Minerals (CENTRUM SILVER 50+MEN) TABS       naloxone (NARCAN) 4 MG/0.1ML nasal spray Spray 1 spray (4 mg) into one nostril alternating nostrils as needed for opioid reversal. every 2-3 minutes until assistance arrives 2 each 1    OLANZapine (ZYPREXA) 5 MG tablet Take 1 tablet (5 mg) by mouth at bedtime. 30 tablet 3    pantoprazole (PROTONIX) 40 MG EC tablet Take  Fraction 60           Assessment and Plan    Principal Problem:    Closed fracture of neck of right femur (HCC) POA: Yes.  S/p ORIF 9/12.  Ortho following.  PT and OT to eval.  Pain controlled.        Fall from ground level POA: Yes.  Likely due to peripheral neuropathy that is chronic.  PT OT eval.        Macrocytosis (new)  POA: Yes.  Serum Vit B12 and folic acid are normal.      Lactic acidosis POA: Yes.  Like from the fall.  Resolved with IVF.  Lactic now 1.0.  Cont IVF and monitor.  No sign of active infection.      Peripheral neuropathy POA: Yes.  Followed by Dr. Zaheer Elliott outpatient.  MRI L-spine in 06/19 as reviewed above; no sign of significant stenosis.  Pt should continue to f/u with Dr. Elliott after discharge for this acute episode.  DAMIEN that is pending.  ESR normal.        Hypertension POA: Yes.  Cont clonidine patch qWeekly.      H/o esophageal stricture:  Tolerating foods OK without chest pain.  Pills need to be crushed.  Monitor for achalasia Sx.  If needed, will consult GI for EGd/dilation.    DVT prophylaxis: SCD.  Will start Lovenox tomorrow per Ortho.    CODE STATUS:  FULL CODE    Disposition: Medically stable for discharge to SNF whenever available.      Case was discussed with Primary RN and Charge Nurse, Medical SW, , and Pharmacist on IDTround in addition to individual(s) mentioned above.    I have performed a physical exam and reviewed and updated ROS as of today, 9/14/2019.  In review of yesterday's note dated, 9/13/2019, there are no changes except as documented above.      Merari Flores M.D.      "1 tablet (40 mg) by mouth 2 times daily 60 tablet 3    prochlorperazine (COMPAZINE) 10 MG tablet Take 1 tablet (10 mg) by mouth every 6 hours as needed for nausea or vomiting. 30 tablet 2    vitamin D3 (CHOLECALCIFEROL) 50 mcg (2000 units) tablet Take 1 tablet (50 mcg) by mouth daily. 90 tablet 1    alteplase (CATHFLO ACTIVASE) injection Inject 2 mLs (2 mg) into catheter as needed (catheter occlusion). Reconstitute vial. Draw up alteplase 1 mg/mL in a syringe and instill into IV catheter. Dwell for at least 20 min to 24 hours, then aspirate. May repeat dose once in 24 hours if catheter function not restored after at least 2 hours. Discard remainder of vial. (Patient not taking: Reported on 4/8/2025) [id number] each 0    blood glucose (FREESTYLE TEST STRIPS) test strip 1 strip by In Vitro route 3 times daily. 100 strip 2    Chemo Kit For RN use only. Do not remove items from bag. Contents: 1  sodium chloride 0.9% flush, 4 medium gloves, 1 chemo gown, 1/4 chemo mat, 1 connector female, 1 chemo bag. [id number] kit 0    Continuous Glucose Sensor (DEXCOM G7 SENSOR) MISC Change every 10 days. 6 each 3    diphenhydrAMINE (BENADRYL) 50 MG/ML injection Inject 1 mL (50 mg) over 3-5 minutes into the vein via push as needed for other (infusion reaction). For RN use only.  Draw up in a syringe and administer IV push.  Discard remainder of vial. (Patient not taking: Reported on 4/8/2025) 13599 mL 0    Emergency Supply Kit, Central, Patient use for emergency only. Contents: 3 sodium chloride 0.9% flushes, 1 dressing kit, 1 microclave ext set 14\", 4 nitrile gloves (med), 6 alcohol prep pads, 1 bacitracin, 1 syringe (10 cc 20 G 1\"). Call [phone] to reorder. 601294 kit 0    EPINEPHrine (ANY BX GENERIC EQUIV) 0.3 MG/0.3ML injection 2-pack Inject 0.3 mLs (0.3 mg) into the muscle as needed for anaphylaxis (infusion reaction). Administer into the mid-thigh in case of severe anaphylaxis (wheezing, throat tightening, mouth swelling, " difficulty breathing). May repeat dose one time in 5-15 minutes if symptoms persist. (Patient not taking: Reported on 4/8/2025) [id number] mL 0    Fluorouracil (ADRUCIL) 2,290 mg in sodium chloride 0.9 % 241 mL via HOMEPUMP C-Series Infuse 2,290 mg at 5.2 mL/hr over 46 hours into the vein once for 1 dose. (Patient not taking: Reported on 4/8/2025) 903936 mL 0    LORazepam (ATIVAN) 2 MG/ML (HIGH CONC) oral solution Take 0.25-0.5 mLs (0.5-1 mg) by mouth every 6 hours as needed for anxiety or muscle spasms. 100 mL 2    methylPREDNISolone Na Suc, PF, (SOLU-MEDROL) 125 mg/2 mL injection Inject 2 mLs (125 mg) over 3-5 minutes into the vein via push as needed (infusion reaction). For RN use only.  Reconstitute vial. Draw up methylPREDNISolone in a syringe and administer.  Discard remainder of vial. [id number] mL 0    metoprolol tartrate (LOPRESSOR) 25 MG tablet Take 0.5 tablets (12.5 mg) by mouth 2 times daily. (Patient not taking: Reported on 4/8/2025) 180 tablet 0    morphine sulfate, high concentrate, (ROXANOL-CONCENTRATED) 20 MG/ML concentrated solution Take 0.75-1.5 mLs (15-30 mg) by mouth every 3 hours as needed for breakthrough pain. 240 mL 0    Port Access Kit For nurse use only.  Do not remove items from bag.  Use for port access.  Do not place syringe on sterile field. [id number] kit 0    sodium chloride 0.9% infusion Infuse 250 mLs over 30 minutes into the vein every 28 (twenty-eight) days. zometa concomitant fluids. Given per therapy plan orders 750 mL 2    sodium chloride 0.9% infusion Infuse 500 mLs into the vein as needed for other (infusion reaction). In case of mild reaction, administer via gravity at 20 mL/hr to keep vein open. In case of severe reaction, administer via gravity wide open on prime setting. 184529 mL 0    sodium chloride, PF, 0.9% PF flush Inject 10 mLs into the vein as needed for line flush. Flush IV before and after med administration as directed and/or at least every 24 hours, or prior to  deaccessing for no further use and/or at least every 4 weeks when not accessed. 964418 mL 0    sodium chloride, PF, 0.9% PF flush Inject 10 mLs into the vein as needed for other (infusion reaction). For RN use only as needed for infusion reaction [id number] mL 0    sterile water, preservative free, injection Use 2.2 mLs for reconstitution as needed (with alteplase for catheter occlusion). Direct diluent stream into powder. Mix by gently swirling until dissolved. DO NOT SHAKE. Discard remainder of vial. 841487 mL 0    zoledronic acid (ZOMETA) 4 MG/100ML infusion Infuse 100 mLs (4 mg) into the vein every 28 days. Infuse via gravity. Given per therapy plan orders 300 mL 2        FAMILY HISTORY:  Family History   Problem Relation Age of Onset    Diabetes Type 2  Father     Cirrhosis Father     Genetic Disorder Brother         missing [id number], mild retardation    Colon Polyps Brother     Pancreatic Cancer Paternal Aunt 85    Colon Cancer Paternal Uncle     Pancreatitis Cousin        SOCIAL HISTORY:  Social History     Socioeconomic History    Marital status:      Spouse name: Not on file    Number of children: Not on file    Years of education: Not on file    Highest education level: Not on file   Occupational History    Not on file   Tobacco Use    Smoking status: Never     Passive exposure: Never    Smokeless tobacco: Never   Vaping Use    Vaping status: Never Used   Substance and Sexual Activity    Alcohol use: Not Currently    Drug use: Never    Sexual activity: Not on file   Other Topics Concern    Not on file   Social History Narrative    Not on file     Social Drivers of Health     Financial Resource Strain: Low Risk  (2/2/2024)    Financial Resource Strain     Within the past 12 months, have you or your family members you live with been unable to get utilities (heat, electricity) when it was really needed?: No   Food Insecurity: No Food Insecurity (3/26/2025)    Received from [facility]      Hunger Vital Sign     Worried About Running Out of Food in the Last Year: Never true     Ran Out of Food in the Last Year: Never true   Transportation Needs: No Transportation Needs (3/26/2025)    Received from [facility]     PRAPARE - Transportation     Lack of Transportation (Medical): No     Lack of Transportation (Non-Medical): No   Physical Activity: Not on file   Stress: Not on file   Social Connections: Not on file   Interpersonal Safety: Not At Risk (3/26/2025)    Received from [facility]     Humiliation, Afraid, Rape, and Kick questionnaire     Fear of Current or Ex-Partner: No     Emotionally Abused: No     Physically Abused: No     Sexually Abused: No   Housing Stability: Low Risk  (3/26/2025)    Received from [facility]     Housing Stability Vital Sign     Unable to Pay for Housing in the Last Year: No     Number of Times Moved in the Last Year: 0     Homeless in the Last Year: No         Review of Systems   A 12-point review of systems was performed. Pertinent findings are noted in the HPI.    Physical Exam   ECOG Status: 3  VITALS: /84 (BP Location: Left arm, Patient Position: Fowlers, Cuff Size: Adult Regular)   Pulse 120   Temp 98.4  F (36.9  C) (Oral)   Resp 18   SpO2 99%   GEN: Appears well, alert, oriented, and in NAD  HEENT: EOMI, normal conjunctiva, MMM  CV: Warm and well-perfused  RESP: Breathing comfortably on room air  SKIN: Normal color and turgor  NEURO: No focal deficits, cranial nerves II-XII grossly intact.  Sensation to light touch intact globally.  Strength 3/5 in bilateral lower extremities.  MSK: Point tenderness to proximal humeral shaft, acromion process, and coracoid process of the right shoulder.  PSYCH: Appropriate mood and affect    Imaging/Path/Labs   Imaging: Per hpi    Path: per hpi     Labs: reviewed    Assessment    Mr. Navarro is a 55 year old male with stage IV adenocarcinoma of the pancreas presenting with widely metastatic  disease upon diagnosis s/p multiple lines of systemic therapy and palliative radiation to T10 and L4 vertebral bodies who presents with worsening lower back pain and right shoulder pain in the setting of progressive metastatic disease.  Radiation oncology was consulted to discuss the role of palliative radiation in management of his right shoulder and lower back pain.    Plan   We had a detailed discussion with Mr. Navarro and his wife regarding his disease and relevant treatment options as they pertain to palliative radiation.  In summary, we recommend completing an MRI of the lumbar spine and sacrum to evaluate for epidural invasion or spinal nerve compression.  He is had gradual weakness of the lower extremities over the last month and weakness is bilateral.  It is difficult to ascertain if weakness is a result of gradual decline in the setting of progressive metastatic disease, or if external compression of spinal nerves could be contributing.  His case is also complicated and that he had palliative RT to the L4 vertebral body previously.  Given his worsening lumbar spine and sacral over the last couple of months, Dr. Haile is concerned that he may be experiencing local progression in and around his areas of prior vertebral augmentation.    In relation to his right shoulder pain, he does have point tenderness focused on 3 bone areas as described above.  We discussed the possibility that his right shoulder pain could be a result of orthopedic injury as he is relying more on upper body strength for transfers and self-cares in light of lower extremity weakness.  His x-ray did show evidence of multiple sclerotic lesions.    We feel Mr. 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  Treating his shoulder may require a larger field given multiple areas of pain, but he does rate his pain is 9/10 in intensity so treating this location is not  unreasonable.  We will await MRI findings prior to initiating definitive plan.  The patient and his wife expressed interest in proceeding with radiation outpatient at [place].  We will facilitate outpatient follow-up with CT simulation at [place] early next week.  If the patient remains hospitalized over the weekend, we can also discuss treatment at our facility.    Patient was seen and discussed with Dr. Mayfield.    Rocco Gaytan MD  PGY-2  Radiation Oncology     Physician Attestation  Mr. Navarro was seen and examined by me. I agree with the findings and plan of care as documented in the note. Note above by Dr. Gaytan was reviewed and edited by me and reflects our mutual findings and plan of care.    Ansley Mayfield MD  Department of Radiation Oncology  [facility]

## (undated) DEVICE — GLOVE SZ 7 BIOGEL PI MICRO - PF LF (50PR/BX 4BX/CA)

## (undated) DEVICE — DRESSING ABDOMINAL PAD STERILE 8 X 10" (360EA/CA)"

## (undated) DEVICE — SUCTION INSTRUMENT YANKAUER OPEN TIP W/O VENT (50EA/CA)

## (undated) DEVICE — BOVIE BLADE COATED - (50/PK)

## (undated) DEVICE — TUBING CLEARLINK DUO-VENT - C-FLO (48EA/CA)

## (undated) DEVICE — HEAD HOLDER JUNIOR/ADULT

## (undated) DEVICE — GLOVE BIOGEL INDICATOR SZ 7.5 SURGICAL PF LTX - (50PR/BX 4BX/CA)

## (undated) DEVICE — PACK MAJOR ORTHO - (2EA/CA)

## (undated) DEVICE — KIT ROOM DECONTAMINATION

## (undated) DEVICE — DRAPE IOBAN II 23 IN X 33 IN - (10/BX)

## (undated) DEVICE — SUTURE 1 VICRYL PLUS CTX - 36 INCH (36/BX)

## (undated) DEVICE — DRAPE C ARMOR (12EA/CA)

## (undated) DEVICE — PENCIL ELECTSURG 10FT BTN SWH - (50/CA)

## (undated) DEVICE — SUTURE 2-0 VICRYL PLUS CT-1 36 (36PK/BX)"

## (undated) DEVICE — DRAPE SURG STERI-DRAPE 7X11OD - (40EA/CA)

## (undated) DEVICE — DRESSING LEUKOMED STERILE 11.75X4IN - (50/CA)

## (undated) DEVICE — SET EXTENSION WITH 2 PORTS (48EA/CA) ***PART #2C8610 IS A SUBSTITUTE*****

## (undated) DEVICE — SUTURE 0 VICRYL PLUS CTX - 36 INCH (36/BX)

## (undated) DEVICE — KIT EVACUATER 3 SPRING PVC LF 1/8 DRAIN SIZE (10EA/CA)"

## (undated) DEVICE — CANISTER SUCTION 3000ML MECHANICAL FILTER AUTO SHUTOFF MEDI-VAC NONSTERILE LF DISP  (40EA/CA)

## (undated) DEVICE — DRAPE 36X28IN RAD CARM BND BG - (25/CA) O

## (undated) DEVICE — SUTURE GENERAL

## (undated) DEVICE — SODIUM CHL IRRIGATION 0.9% 1000ML (12EA/CA)

## (undated) DEVICE — HANDPIECE 10FT INTPLS SCT PLS IRRIGATION HAND CONTROL SET (6/PK)

## (undated) DEVICE — ELECTRODE 850 FOAM ADHESIVE - HYDROGEL RADIOTRNSPRNT (50/PK)

## (undated) DEVICE — LACTATED RINGERS INJ 1000 ML - (14EA/CA 60CA/PF)

## (undated) DEVICE — DRESSING PETROLEUM GAUZE 5 X 9" (50EA/BX 4BX/CA)"

## (undated) DEVICE — TIP INTPLS HFLO ML ORFC BTRY - (12/CS)  FOR SURGILAV

## (undated) DEVICE — SLEEVE, VASO, THIGH, MED

## (undated) DEVICE — DRILL BIT

## (undated) DEVICE — PROTECTOR ULNA NERVE - (36PR/CA)

## (undated) DEVICE — DRAPE STRLE REG TOWEL 18X24 - (10/BX 4BX/CA)"

## (undated) DEVICE — GLOVE BIOGEL PI INDICATOR SZ 7.5 SURGICAL PF LF -(50/BX 4BX/CA)

## (undated) DEVICE — NEPTUNE 4 PORT MANIFOLD - (20/PK)

## (undated) DEVICE — ELECTRODE DUAL RETURN W/ CORD - (50/PK)

## (undated) DEVICE — DRAPE SURGICAL U 77X120 - (10/CA)

## (undated) DEVICE — SET LEADWIRE 5 LEAD BEDSIDE DISPOSABLE ECG (1SET OF 5/EA)

## (undated) DEVICE — GLOVE BIOGEL PI ORTHO SZ 7 PF LF (40PR/BX)

## (undated) DEVICE — SENSOR SPO2 NEO LNCS ADHESIVE (20/BX) SEE USER NOTES

## (undated) DEVICE — DRAIN PENROSE 1 IN X 18 IN - STERILE (25EA/BX)

## (undated) DEVICE — KIT ANESTHESIA W/CIRCUIT & 3/LT BAG W/FILTER (20EA/CA)

## (undated) DEVICE — SUCTION INSTRUMENT YANKAUER BULBOUS TIP W/O VENT (50EA/CA)

## (undated) DEVICE — MASK ANESTHESIA ADULT  - (100/CA)

## (undated) DEVICE — GOWN WARMING STANDARD FLEX - (30/CA)

## (undated) DEVICE — CHLORAPREP 26 ML APPLICATOR - ORANGE TINT(25/CA)

## (undated) DEVICE — DRAPE C-ARM LARGE 41IN X 74 IN - (10/BX 2BX/CA)